# Patient Record
Sex: MALE | Race: WHITE | NOT HISPANIC OR LATINO | Employment: FULL TIME | URBAN - METROPOLITAN AREA
[De-identification: names, ages, dates, MRNs, and addresses within clinical notes are randomized per-mention and may not be internally consistent; named-entity substitution may affect disease eponyms.]

---

## 2018-05-21 ENCOUNTER — OFFICE VISIT (OUTPATIENT)
Dept: FAMILY MEDICINE | Facility: CLINIC | Age: 48
End: 2018-05-21
Payer: COMMERCIAL

## 2018-05-21 VITALS
BODY MASS INDEX: 27.77 KG/M2 | HEIGHT: 72 IN | DIASTOLIC BLOOD PRESSURE: 78 MMHG | WEIGHT: 205 LBS | HEART RATE: 47 BPM | SYSTOLIC BLOOD PRESSURE: 124 MMHG | TEMPERATURE: 98.1 F | OXYGEN SATURATION: 97 %

## 2018-05-21 DIAGNOSIS — S42.001A CLOSED DISPLACED FRACTURE OF RIGHT CLAVICLE, UNSPECIFIED PART OF CLAVICLE, INITIAL ENCOUNTER: Primary | ICD-10-CM

## 2018-05-21 PROCEDURE — 99213 OFFICE O/P EST LOW 20 MIN: CPT | Performed by: INTERNAL MEDICINE

## 2018-05-21 RX ORDER — PENICILLIN V POTASSIUM 500 MG/1
TABLET, FILM COATED ORAL 2 TIMES DAILY
COMMUNITY
End: 2018-10-09
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
## 2018-05-21 NOTE — MR AVS SNAPSHOT
After Visit Summary   5/21/2018    Quincy Harrell    MRN: [id number]           Patient Information     Date Of Birth          1970        Visit Information        Provider Department      5/21/2018 4:00 PM Emma Salgado MD [facility]        Today's Diagnoses     Closed displaced fracture of right clavicle, unspecified part of clavicle, initial encounter    -  1       Follow-ups after your visit        Additional Services     ORTHO  REFERRAL       [facility] is referring you to the Orthopedic  Services at [facility].       The  Representative will assist you in the coordination of your Orthopedic and Musculoskeletal Care as prescribed by your physician.    The  Representative will call you within 1 business day to help schedule your appointment, or you may contact the  Representative at:    All areas ~ [phone]     Type of Referral : Surgical / Specialist       Timeframe requested: 3 - 5 days    Coverage of these services is subject to the terms and limitations of your health insurance plan.  Please call member services at your health plan with any benefit or coverage questions.      If X-rays, CT or MRI's have been performed, please contact the facility where they were done to arrange for , prior to your scheduled appointment.  Please bring this referral request to your appointment and present it to your specialist.                  Who to contact     If you have questions or need follow up information about today's clinic visit or your schedule please contact [facility] directly at [phone].  Normal or non-critical lab and imaging results will be communicated to you by MyChart, letter or phone within 4 business days after the clinic has received the results. If you do not hear from us within 7 days, please contact the clinic through  Livemaphart or phone. If you have a critical or abnormal lab result, we will notify you by phone as soon as possible.  Submit refill requests through Explorys or call your pharmacy and they will forward the refill request to us. Please allow 3 business days for your refill to be completed.          Additional Information About Your Visit        Livemaphart Information     Explorys gives you secure access to your electronic health record. If you see a primary care provider, you can also send messages to your care team and make appointments. If you have questions, please call your primary care clinic.  If you do not have a primary care provider, please call [phone] and they will assist you.        Care EveryWhere ID     This is your Care EveryWhere ID. This could be used by other organizations to access your [facility] medical records  [id number]         Blood Pressure from Last 3 Encounters:   12/05/16 116/79   06/07/16 124/64   05/10/16 116/72    Weight from Last 3 Encounters:   12/05/16 211 lb (95.7 kg)   06/07/16 201 lb (91.2 kg)   05/10/16 203 lb (92.1 kg)              We Performed the Following     ORTHO  REFERRAL        Primary Care Provider Fax #    Physician No Ref-Primary [phone]       No address on file        Equal Access to Services     JAYDA SIMEON : Hadii evan freedmano Sokennediali, waaxda luqadaha, qaybta kaalmada adeegyada, federico crawford. So [facility] [phone].    ATENCIÓN: Si habla español, tiene a daniels disposición servicios gratuitos de asistencia lingüística. Llame al [phone].    We comply with applicable federal civil rights laws and [place] laws. We do not discriminate on the basis of race, color, national origin, age, disability, sex, sexual orientation, or gender identity.            Thank you!     Thank you for choosing [facility]  for your care. Our goal is always to provide you with excellent care. Hearing back from our patients is  one way we can continue to improve our services. Please take a few minutes to complete the written survey that you may receive in the mail after your visit with us. Thank you!             Your Updated Medication List - Protect others around you: Learn how to safely use, store and throw away your medicines at www.disposemymeds.org.          This list is accurate as of 5/21/18  4:29 PM.  Always use your most recent med list.                   Brand Name Dispense Instructions for use Diagnosis    acyclovir 800 MG tablet    ZOVIRAX    35 tablet    Take 1 tablet (800 mg) by mouth 5 times daily    HSV (herpes simplex virus) infection       NO ACTIVE MEDICATIONS       Routine general medical examination at a health care facility, Familial hypercholesteremia, Hyperlipidemia LDL goal <160

## 2018-10-08 ASSESSMENT — ENCOUNTER SYMPTOMS: ARTHRALGIAS: 1

## 2018-10-09 ENCOUNTER — OFFICE VISIT (OUTPATIENT)
Dept: FAMILY MEDICINE | Facility: CLINIC | Age: 48
End: 2018-10-09
Payer: COMMERCIAL

## 2018-10-09 VITALS
WEIGHT: 203 LBS | HEART RATE: 89 BPM | RESPIRATION RATE: 16 BRPM | DIASTOLIC BLOOD PRESSURE: 67 MMHG | SYSTOLIC BLOOD PRESSURE: 109 MMHG | TEMPERATURE: 98.6 F | BODY MASS INDEX: 27.5 KG/M2 | HEIGHT: 72 IN

## 2018-10-09 DIAGNOSIS — B00.9 HSV (HERPES SIMPLEX VIRUS) INFECTION: ICD-10-CM

## 2018-10-09 DIAGNOSIS — Z00.00 WELL ADULT ON ROUTINE HEALTH CHECK: Primary | ICD-10-CM

## 2018-10-09 DIAGNOSIS — Z11.3 SCREEN FOR STD (SEXUALLY TRANSMITTED DISEASE): ICD-10-CM

## 2018-10-09 DIAGNOSIS — D22.9 ATYPICAL NEVUS: ICD-10-CM

## 2018-10-09 LAB
CHOLEST SERPL-MCNC: 242 MG/DL
GLUCOSE SERPL-MCNC: 101 MG/DL (ref 70–99)
HDLC SERPL-MCNC: 52 MG/DL
LDLC SERPL CALC-MCNC: 158 MG/DL
NONHDLC SERPL-MCNC: 190 MG/DL
TRIGL SERPL-MCNC: 162 MG/DL

## 2018-10-09 PROCEDURE — 87389 HIV-1 AG W/HIV-1&-2 AB AG IA: CPT | Performed by: NURSE PRACTITIONER

## 2018-10-09 PROCEDURE — 87591 N.GONORRHOEAE DNA AMP PROB: CPT | Performed by: NURSE PRACTITIONER

## 2018-10-09 PROCEDURE — 82947 ASSAY GLUCOSE BLOOD QUANT: CPT | Performed by: NURSE PRACTITIONER

## 2018-10-09 PROCEDURE — 87491 CHLMYD TRACH DNA AMP PROBE: CPT | Performed by: NURSE PRACTITIONER

## 2018-10-09 PROCEDURE — 86803 HEPATITIS C AB TEST: CPT | Performed by: NURSE PRACTITIONER

## 2018-10-09 PROCEDURE — 99396 PREV VISIT EST AGE 40-64: CPT | Performed by: NURSE PRACTITIONER

## 2018-10-09 PROCEDURE — 80061 LIPID PANEL: CPT | Performed by: NURSE PRACTITIONER

## 2018-10-09 PROCEDURE — 36415 COLL VENOUS BLD VENIPUNCTURE: CPT | Performed by: NURSE PRACTITIONER

## 2018-10-09 RX ORDER — ACYCLOVIR 800 MG/1
800 TABLET ORAL
Qty: 35 TABLET | Refills: 3 | Status: SHIPPED | OUTPATIENT
Start: 2018-10-09 | End: 2020-02-19

## 2018-10-09 ASSESSMENT — ANXIETY QUESTIONNAIRES
1. FEELING NERVOUS, ANXIOUS, OR ON EDGE: NOT AT ALL
5. BEING SO RESTLESS THAT IT IS HARD TO SIT STILL: NOT AT ALL
3. WORRYING TOO MUCH ABOUT DIFFERENT THINGS: NOT AT ALL
6. BECOMING EASILY ANNOYED OR IRRITABLE: NOT AT ALL
2. NOT BEING ABLE TO STOP OR CONTROL WORRYING: NOT AT ALL
GAD7 TOTAL SCORE: 0
IF YOU CHECKED OFF ANY PROBLEMS ON THIS QUESTIONNAIRE, HOW DIFFICULT HAVE THESE PROBLEMS MADE IT FOR YOU TO DO YOUR WORK, TAKE CARE OF THINGS AT HOME, OR GET ALONG WITH OTHER PEOPLE: NOT DIFFICULT AT ALL
7. FEELING AFRAID AS IF SOMETHING AWFUL MIGHT HAPPEN: NOT AT ALL

## 2018-10-09 ASSESSMENT — ENCOUNTER SYMPTOMS: ARTHRALGIAS: 1

## 2018-10-09 ASSESSMENT — PATIENT HEALTH QUESTIONNAIRE - PHQ9: 5. POOR APPETITE OR OVEREATING: NOT AT ALL

## 2018-10-09 NOTE — MR AVS SNAPSHOT
After Visit Summary   10/9/2018    Quincy Harrell    MRN: [id number]           Patient Information     Date Of Birth          1970        Visit Information        Provider Department      10/9/2018 7:40 AM Mago Waters APRN [facility]        Today's Diagnoses     Well adult on routine health check    -  1    HSV (herpes simplex virus) infection        Atypical nevus        Screen for STD (sexually transmitted disease)          Care Instructions      Preventive Health Recommendations  Male Ages 40 to 49    Yearly exam:             See your health care provider every year in order to  o   Review health changes.   o   Discuss preventive care.    o   Review your medicines if your doctor has prescribed any.    You should be tested each year for STDs (sexually transmitted diseases) if you re at risk.     Have a cholesterol test every 5 years.     Have a colonoscopy (test for colon cancer) if someone in your family has had colon cancer or polyps before age 50.     After age 45, have a diabetes test (fasting glucose). If you are at risk for diabetes, you should have this test every 3 years.      Talk with your health care provider about whether or not a prostate cancer screening test (PSA) is right for you.    Shots: Get a flu shot each year. Get a tetanus shot every 10 years.     Nutrition:    Eat at least 5 servings of fruits and vegetables daily.     Eat whole-grain bread, whole-wheat pasta and brown rice instead of white grains and rice.     Get adequate Calcium and Vitamin D.     Lifestyle    Exercise for at least 150 minutes a week (30 minutes a day, 5 days a week). This will help you control your weight and prevent disease.     Limit alcohol to one drink per day.     No smoking.     Wear sunscreen to prevent skin cancer.     See your dentist every six months for an exam and cleaning.              Follow-ups after your visit        Additional Services      DERMATOLOGY REFERRAL       Your provider has referred you to: FMG: [facility] - [place] [phone]   [email or web address]  UM: Dermatology Clinic [facility] [phone]   [email or web address]  FHN: [facility] - [place] / [place] / [place] / [place] [phone]   [email or web address]  N: [facility] [place] [phone]   [email or web address]  [facility]. - [place] [phone]   [email or web address]  [facility] - [place] [phone]   [email or web address]    Please be aware that coverage of these services is subject to the terms and limitations of your health insurance plan.  Call member services at your health plan with any benefit or coverage questions.      Please bring the following to your appointment:  Any x-rays, CTs or MRIs which have been performed.  Contact the facility where they were done to arrange for  prior to your scheduled appointment.  Any new CT, MRI or other procedures ordered by your specialist must be performed at a [place] facility or coordinated by your clinic's referral office.    List of current medications   This referral request   Any documents/labs given to you for this referral                  Who to contact     If you have questions or need follow up information about today's clinic visit or your schedule please contact [facility] directly at [phone].  Normal or non-critical lab and imaging results will be communicated to you by MyChart, letter or phone within 4 business days after the clinic has received the results. If you do not hear from us within 7 days, please contact the clinic through MyChart or phone. If you have a critical or abnormal lab result, we will notify you by phone as  soon as possible.  Submit refill requests through OwnerListens or call your pharmacy and they will forward the refill request to us. Please allow 3 business days for your refill to be completed.          Additional Information About Your Visit        WatsinharPrinciple Power Information     OwnerListens gives you secure access to your electronic health record. If you see a primary care provider, you can also send messages to your care team and make appointments. If you have questions, please call your primary care clinic.  If you do not have a primary care provider, please call [phone] and they will assist you.        Care EveryWhere ID     This is your Care EveryWhere ID. This could be used by other organizations to access your [place] medical records  [id number]        Your Vitals Were     Pulse Temperature Respirations Height BMI (Body Mass Index)       89 98.6  F (37  C) (Oral) 16 6' (1.829 m) 27.53 kg/m2        Blood Pressure from Last 3 Encounters:   10/09/18 109/67   05/21/18 124/78   12/05/16 116/79    Weight from Last 3 Encounters:   10/09/18 203 lb (92.1 kg)   05/21/18 205 lb (93 kg)   12/05/16 211 lb (95.7 kg)              We Performed the Following     CHLAMYDIA TRACHOMATIS PCR     DERMATOLOGY REFERRAL     Glucose     Hepatitis C antibody     HIV Antigen Antibody Combo     Lipid Profile (Chol, Trig, HDL, LDL calc)     NEISSERIA GONORRHOEA PCR          Today's Medication Changes          These changes are accurate as of 10/9/18  8:17 AM.  If you have any questions, ask your nurse or doctor.               These medicines have changed or have updated prescriptions.        Dose/Directions    acyclovir 800 MG tablet   Commonly known as:  ZOVIRAX   This may have changed:  See the new instructions.   Used for:  HSV (herpes simplex virus) infection   Changed by:  Mago Waters APRN CNP        Dose:  800 mg   Take 1 tablet (800 mg) by mouth 5 times daily   Quantity:  35 tablet   Refills:  3            Where to get your  medicines      These medications were sent to [id number] - [place], [place] - [address] AT [facility] [address]  [address], [place] [address]    Hours:  24-hours Phone:  [phone]     acyclovir 800 MG tablet                Primary Care Provider Fax #    Physician No Ref-Primary [phone]       No address on file        Equal Access to Services     JAYDA SIMEON : Hadii aad ku hadasho Soomaali, waaxda luqadaha, qaybta kaalmada adeegyada, waxay idiin hayaan adeeg khdedrash lamatt . So [facility] [phone].    ATENCIÓN: Si habla español, tiene a daniels disposición servicios gratuitos de asistencia lingüística. Galina al [phone].    We comply with applicable federal civil rights laws and [place] laws. We do not discriminate on the basis of race, color, national origin, age, disability, sex, sexual orientation, or gender identity.            Thank you!     Thank you for choosing [facility]  for your care. Our goal is always to provide you with excellent care. Hearing back from our patients is one way we can continue to improve our services. Please take a few minutes to complete the written survey that you may receive in the mail after your visit with us. Thank you!             Your Updated Medication List - Protect others around you: Learn how to safely use, store and throw away your medicines at www.disposemymeds.org.          This list is accurate as of 10/9/18  8:17 AM.  Always use your most recent med list.                   Brand Name Dispense Instructions for use Diagnosis    acyclovir 800 MG tablet    ZOVIRAX    35 tablet    Take 1 tablet (800 mg) by mouth 5 times daily    HSV (herpes simplex virus) infection       NO ACTIVE MEDICATIONS       Routine general medical examination at a health care facility, Familial hypercholesteremia, Hyperlipidemia LDL goal <160

## 2018-10-09 NOTE — PROGRESS NOTES
SUBJECTIVE:   CC: Quincy Harrell is an 48 year old male who presents for preventative health visit.     Physical   Annual:     Getting at least 3 servings of Calcium per day:  NO    Bi-annual eye exam:  Yes    Dental care twice a year:  Yes    Sleep apnea or symptoms of sleep apnea:  None    Diet:  Regular (no restrictions)    Frequency of exercise:  4-5 days/week    Duration of exercise:  45-60 minutes    Taking medications regularly:  Not Applicable    Additional concerns today:  YES (skin rash on right upper leg)    Today's PHQ-2 Score:   PHQ-2 ( 1999 Pfizer) 10/8/2018   Q1: Little interest or pleasure in doing things 0   Q2: Feeling down, depressed or hopeless 0   PHQ-2 Score 0   Q1: Little interest or pleasure in doing things Not at all   Q2: Feeling down, depressed or hopeless Not at all   PHQ-2 Score 0       Abuse: Current or Past(Physical, Sexual or Emotional)- No  Do you feel safe in your environment - Yes    Social History   Substance Use Topics     Smoking status: Never Smoker     Smokeless tobacco: Never Used     Alcohol use 0.0 oz/week      Comment: about 2 week     Alcohol Use 10/8/2018   If you drink alcohol do you typically have greater than 3 drinks per day OR greater than 7 drinks per week? No     Last PSA: No results found for: PSA    Reviewed orders with patient. Reviewed health maintenance and updated orders accordingly - Yes    Reviewed and updated as needed this visit by clinical staff  Tobacco  Allergies  Meds  Problems  Med Hx  Surg Hx  Fam Hx  Soc Hx          Reviewed and updated as needed this visit by Provider  Tobacco  Allergies  Meds  Problems  Med Hx  Surg Hx  Fam Hx  Soc Hx         Review of Systems   Genitourinary: Negative for discharge and impotence.   Musculoskeletal: Positive for arthralgias.     CONSTITUTIONAL: NEGATIVE for fever, chills, change in weight  INTEGUMENTARY/SKIN: NEGATIVE for worrisome rashes, moles or lesions  EYES: NEGATIVE for vision  changes or irritation  ENT: NEGATIVE for ear, mouth and throat problems  RESP: NEGATIVE for significant cough or SOB  CV: NEGATIVE for chest pain, palpitations or peripheral edema  GI: NEGATIVE for nausea, abdominal pain, heartburn, or change in bowel habits   male: negative for dysuria, hematuria, decreased urinary stream, erectile dysfunction, urethral discharge  MUSCULOSKELETAL: NEGATIVE for significant arthralgias or myalgia  NEURO: NEGATIVE for weakness, dizziness or paresthesias  PSYCHIATRIC: NEGATIVE for changes in mood or affect    OBJECTIVE:   /67  Pulse 89  Temp 98.6  F (37  C) (Oral)  Resp 16  Ht 6' (1.829 m)  Wt 203 lb (92.1 kg)  BMI 27.53 kg/m2    Physical Exam  GENERAL: healthy, alert and no distress  EYES: Eyes grossly normal to inspection, PERRL and conjunctivae and sclerae normal  HENT: ear canals and TM's normal, nose and mouth without ulcers or lesions  NECK: no adenopathy, no asymmetry, masses, or scars and thyroid normal to palpation  RESP: lungs clear to auscultation - no rales, rhonchi or wheezes  CV: regular rate and rhythm, normal S1 S2, no S3 or S4, no murmur, click or rub, no peripheral edema and peripheral pulses strong  ABDOMEN: soft, nontender, no hepatosplenomegaly, no masses and bowel sounds normal  MS: no gross musculoskeletal defects noted, no edema  SKIN: 6 mm bicolored changing nevus on right anterior thigh  NEURO: Normal strength and tone, mentation intact and speech normal  PSYCH: mentation appears normal, affect normal/bright    Diagnostic Test Results:  Results for orders placed or performed in visit on 10/09/18 (from the past 24 hour(s))   Lipid Profile (Chol, Trig, HDL, LDL calc)   Result Value Ref Range    Cholesterol 242 (H) <200 mg/dL    Triglycerides 162 (H) <150 mg/dL    HDL Cholesterol 52 >39 mg/dL    LDL Cholesterol Calculated 158 (H) <100 mg/dL    Non HDL Cholesterol 190 (H) <130 mg/dL   Glucose   Result Value Ref Range    Glucose 101 (H) 70 - 99 mg/dL        ASSESSMENT/PLAN:       ICD-10-CM    1. Well adult on routine health check Z00.00 Lipid Profile (Chol, Trig, HDL, LDL calc)     Glucose   2. HSV (herpes simplex virus) infection B00.9 acyclovir (ZOVIRAX) 800 MG tablet   3. Atypical nevus D22.9 DERMATOLOGY REFERRAL   4. Screen for STD (sexually transmitted disease) Z11.3 NEISSERIA GONORRHOEA PCR     CHLAMYDIA TRACHOMATIS PCR     Hepatitis C antibody     HIV Antigen Antibody Combo      well male,  fasting for labs.  Encouraged to continue exercise and healthy diet choices.      Refill acyclovir for PRN use.      Refer to derm for rapidly changing bicolored nevus.      I discussed the transmission and risks associated with STD's as well as appropriate prevention, screening and treatment.      COUNSELING:   Reviewed preventive health counseling, as reflected in patient instructions    BP Readings from Last 1 Encounters:   10/09/18 109/67     Estimated body mass index is 27.53 kg/(m^2) as calculated from the following:    Height as of this encounter: 6' (1.829 m).    Weight as of this encounter: 203 lb (92.1 kg).           reports that he has never smoked. He has never used smokeless tobacco.      Counseling Resources:  ATP IV Guidelines  Pooled Cohorts Equation Calculator  FRAX Risk Assessment  ICSI Preventive Guidelines  Dietary Guidelines for Americans, 2010  USDA's MyPlate  ASA Prophylaxis  Lung CA Screening    ROSETTA Adams CNP  [facility] [place]  Answers for HPI/ROS submitted by the patient on 10/8/2018   PHQ-2 Score: 0

## 2018-10-10 LAB
C TRACH DNA SPEC QL NAA+PROBE: NEGATIVE
HCV AB SERPL QL IA: NONREACTIVE
HIV 1+2 AB+HIV1 P24 AG SERPL QL IA: NONREACTIVE
N GONORRHOEA DNA SPEC QL NAA+PROBE: NEGATIVE
SPECIMEN SOURCE: NORMAL
SPECIMEN SOURCE: NORMAL

## 2018-10-10 ASSESSMENT — PATIENT HEALTH QUESTIONNAIRE - PHQ9: SUM OF ALL RESPONSES TO PHQ QUESTIONS 1-9: 1

## 2018-10-10 ASSESSMENT — ANXIETY QUESTIONNAIRES: GAD7 TOTAL SCORE: 0

## 2018-10-15 ENCOUNTER — OFFICE VISIT (OUTPATIENT)
Dept: FAMILY MEDICINE | Facility: CLINIC | Age: 48
End: 2018-10-15
Payer: COMMERCIAL

## 2018-10-15 VITALS — HEART RATE: 49 BPM | SYSTOLIC BLOOD PRESSURE: 121 MMHG | DIASTOLIC BLOOD PRESSURE: 70 MMHG

## 2018-10-15 DIAGNOSIS — D48.5 NEOPLASM OF UNCERTAIN BEHAVIOR OF SKIN: Primary | ICD-10-CM

## 2018-10-15 PROCEDURE — 11300 SHAVE SKIN LESION 0.5 CM/<: CPT | Performed by: FAMILY MEDICINE

## 2018-10-15 PROCEDURE — 88305 TISSUE EXAM BY PATHOLOGIST: CPT | Mod: TC | Performed by: FAMILY MEDICINE

## 2018-10-15 NOTE — LETTER
10/15/2018         RE: Quincy Harrell  [address] [place] [address]        Dear Colleague,    Thank you for referring your patient, Quincy Harrell, to [facility]. Please see a copy of my visit note below.    [facility] - PRIMARY CARE SKIN    CC : Lesion(s)  SUBJECTIVE:                                                    Quincy Harrell is a 48 year old male who presents to clinic today because of a mole on the right leg.    Bothersome lesions noticed by the patient or other skin concerns :  Issue One : A mole on the right leg has been changing over the last 6 weeks and has a dry texture.  Onset : beginning of Sept 2018.  Enlarging : NO.  Bleeding : NO  Itchy or irritating : NO.  Pain or tenderness : NO.  Changing color : YES.    Personal history of skin cancer : NO.  Family history of skin cancer : YES - skin cancer unknown type in grandfather.    Personal Medical History  Eczema Psoriasis Autoimmune   NO NO NO     Family Medical History  Eczema Psoriasis Autoimmune   NO ?YES in father NO     Sun Exposure History  Previous history of significant sun exposure:  Blistering sunburns : NO  Tanning beds : NO.  Sunscreen Use : YES, SPF : 50.  Sun-protective clothing use : Yes  Wide-brimmed hats : Yes  Sunglasses : unknown  Seeks shade : Yes    Occupation : office work (indoor).    Refer to electronic medical record (EMR) for past medical history and medications.    INTEGUMENTARY/SKIN: POSITIVE for mole changes  ROS : 14 point review of systems was negative except the symptoms listed above in the HPI.    This document serves as a record of the services and decisions personally performed and made by Octavia Abbott MD. It was created on her behalf by Masoud Butts, a trained medical scribe.  The creation of this document is based on the scribe's personal observations and the provider's statements to the medical scribe.  Masoud Butts, October 15, 2018 7:59  "AM      OBJECTIVE:                                                    GENERAL: healthy, alert and no distress  SKIN: Lambert Skin Type - II.  Legs were examined. The dermatoscope was used to help evaluate pigmented lesions.  Skin Pertinent Findings:  Right anterolateral thigh : 4 mm in size scaly brown coarse-textured lesion(s) with radial pigmentation pattern. ? Seborrheic keratosis ? Other    Diagnostic Test Results:  none           ASSESSMENT:                                                      Encounter Diagnosis   Name Primary?     Neoplasm of uncertain behavior of skin Yes         PLAN:                                                    Patient Instructions   FUTURE APPOINTMENTS  Follow up per pathology report.    WOUND CARE INSTRUCTIONS  1. Wash hands before every dressing change.  2. After 24 hours, change dressing daily.  3. Wash the wound area with a mild soap, then rinse.  4. Gently pat dry with a sterile gauze or Q-tip.  5. Using a Q-tip, apply Vaseline or Aquaphor only over entire wound. Do NOT use Neosporin - as many people react to neomycin.  6. Finally, cover with a bandage or sterile non-stick gauze with micropore paper tape.  7. Repeat once daily until wound has healed.      Soap, water and shampoo will not hurt this area.    Do not go swimming or take baths, but showering is encouraged.    Limit use of the area where the procedure was done for a few days to allow for optimal healing.    If you experience bleeding:  Wash hands and hold firm pressure on the area for 10 minutes without checking to see if the bleeding has stopped. \"Checking\" pulls off the protective wound clot and restarts the bleeding all over again. Re-apply pressure for 10 minutes if necessary to stop bleeding.  Use additional sterile gauze and tape to maintain pressure once bleeding has stopped.  If bleeding continues, then call back to clinic at [phone].    Signs of Infection:  Infection can occur in any area where " skin has been disrupted.  If you notice persistent redness, swelling, colored drainage, increasing pain, fever or other signs of infection, please call us at: [phone] and ask to have me or my colleague paged. We will call you back to discuss.    Pathology Results:  You will be notified, generally via letter or MyChart, in approximately 10 days. If there is anything we need to discuss or further treatment needed, I will call you to discuss it.    PATIENT INFORMATION : WOUNDS  During the healing process you will notice a number of changes. All wounds develop a small halo of redness surrounding the wound.  This means healing is occurring. Severe itching with extensive redness usually indicates sensitivity to the ointment or bandage tape used to dress the wound.  You should call our office if this develops.      Swelling  and/or discoloration around your surgical site is common, particularly when performed around the eye.    All wounds normally drain.  The larger the wound the more drainage there will be.  After 7-10 days, you will notice the wound beginning to shrink and new skin will begin to grow.  The wound is healed when you can see skin has formed over the entire area.  A healed wound has a healthy, shiny look to the surface and is red to dark pink in color to normalize.  Wounds may take approximately 4-6 weeks to heal.  Larger wounds may take 6-8 weeks. After the wound is healed you may discontinue dressing changes.    You may experience a sensation of tightness as your wound heals. This is normal and will gradually subside.    Your healed wound may be sensitive to temperature changes. This sensitivity improves with time, but if you re having a lot of discomfort, try to avoid temperature extremes.    Patients frequently experience itching after their wound appears to have healed because of the continue healing under the skin.  Plain Vaseline will help relieve the itching.    Educational brochures given to  patient : seborrheic keratosis, skin cancer.       PROCEDURES:                                                    Name : Shave Excision  Indication : Excision of tissue for pathology evaluation.  Location(s) : Right anterolateral thigh : 4 mm in size scaly brown coarse-textured lesion(s) with radial pigmentation pattern. ? Seborrheic keratosis ? Other.  Completed by : Octavia Abbott MD  Photo Taken : no.  Anesthesia : Patient was anesthetized by infiltrating the area surrounding the lesion with 1% lidocaine.   epinephrine [id number] : Yes.  Note : Discussed the risk of pain, infection, scarring, hypo- or hyperpigmentation and recurrence or need for re-treatment. The benefits of treatment and alternative treatments were also discussed.    During this procedure, the universal protocol was utilized. The patient's identity was confirmed by no less than two patient identifiers, correct procedure was verified, correct site was verified and marked as applicable and a final pause was completed.    Sterile technique was used throughout the procedure. The skin was cleaned and prepped with surgical cleanser. Once adequate anesthesia was obtained, the lesion was removed with a deep scallop shave procedure. The specimen was sent to pathology.    Direct pressure and aluminum chloride and monopolar cautery was applied for hemostasis. No bleeding was present upon the completion of the procedure. The wound was coated with antibacterial ointment. A dry sterile dressing was applied. Patient tolerated the procedure well and left in satisfactory condition.    Primary provider and referring provider will be informed regarding the tissue report when it returns.      The information in this document, created by the medical scribe for me, accurately reflects the services I personally performed and the decisions made by me. I have reviewed and approved this document for accuracy prior to leaving the patient care area.  Octavia Abbott MD October  15, 2018 7:59 AM  [facility] [place]    Again, thank you for allowing me to participate in the care of your patient.        Sincerely,        Violette Abbott MD

## 2018-10-15 NOTE — PROGRESS NOTES
[facility] - PRIMARY CARE SKIN    CC : Lesion(s)  SUBJECTIVE:                                                    Quincy Harrell is a 48 year old male who presents to clinic today because of a mole on the right leg.    Bothersome lesions noticed by the patient or other skin concerns :  Issue One : A mole on the right leg has been changing over the last 6 weeks and has a dry texture.  Onset : beginning of Sept 2018.  Enlarging : NO.  Bleeding : NO  Itchy or irritating : NO.  Pain or tenderness : NO.  Changing color : YES.    Personal history of skin cancer : NO.  Family history of skin cancer : YES - skin cancer unknown type in grandfather.    Personal Medical History  Eczema Psoriasis Autoimmune   NO NO NO     Family Medical History  Eczema Psoriasis Autoimmune   NO ?YES in father NO     Sun Exposure History  Previous history of significant sun exposure:  Blistering sunburns : NO  Tanning beds : NO.  Sunscreen Use : YES, SPF : 50.  Sun-protective clothing use : Yes  Wide-brimmed hats : Yes  Sunglasses : unknown  Seeks shade : Yes    Occupation : office work (indoor).    Refer to electronic medical record (EMR) for past medical history and medications.    INTEGUMENTARY/SKIN: POSITIVE for mole changes  ROS : 14 point review of systems was negative except the symptoms listed above in the HPI.    This document serves as a record of the services and decisions personally performed and made by Octavia Abbott MD. It was created on her behalf by Masoud Butts, a trained medical scribe.  The creation of this document is based on the scribe's personal observations and the provider's statements to the medical scribe.  Masoud Butts, October 15, 2018 7:59 AM      OBJECTIVE:                                                    GENERAL: healthy, alert and no distress  SKIN: Lambert Skin Type - II.  Legs were examined. The dermatoscope was used to help evaluate pigmented lesions.  Skin Pertinent Findings:  Right  "anterolateral thigh : 4 mm in size scaly brown coarse-textured lesion(s) with radial pigmentation pattern. ? Seborrheic keratosis ? Other    Diagnostic Test Results:  none           ASSESSMENT:                                                      Encounter Diagnosis   Name Primary?     Neoplasm of uncertain behavior of skin Yes         PLAN:                                                    Patient Instructions   FUTURE APPOINTMENTS  Follow up per pathology report.    WOUND CARE INSTRUCTIONS  1. Wash hands before every dressing change.  2. After 24 hours, change dressing daily.  3. Wash the wound area with a mild soap, then rinse.  4. Gently pat dry with a sterile gauze or Q-tip.  5. Using a Q-tip, apply Vaseline or Aquaphor only over entire wound. Do NOT use Neosporin - as many people react to neomycin.  6. Finally, cover with a bandage or sterile non-stick gauze with micropore paper tape.  7. Repeat once daily until wound has healed.      Soap, water and shampoo will not hurt this area.    Do not go swimming or take baths, but showering is encouraged.    Limit use of the area where the procedure was done for a few days to allow for optimal healing.    If you experience bleeding:  Wash hands and hold firm pressure on the area for 10 minutes without checking to see if the bleeding has stopped. \"Checking\" pulls off the protective wound clot and restarts the bleeding all over again. Re-apply pressure for 10 minutes if necessary to stop bleeding.  Use additional sterile gauze and tape to maintain pressure once bleeding has stopped.  If bleeding continues, then call back to clinic at [phone].    Signs of Infection:  Infection can occur in any area where skin has been disrupted.  If you notice persistent redness, swelling, colored drainage, increasing pain, fever or other signs of infection, please call us at: [phone] and ask to have me or my colleague paged. We will call you back to discuss.    Pathology " Results:  You will be notified, generally via letter or MyChart, in approximately 10 days. If there is anything we need to discuss or further treatment needed, I will call you to discuss it.    PATIENT INFORMATION : WOUNDS  During the healing process you will notice a number of changes. All wounds develop a small halo of redness surrounding the wound.  This means healing is occurring. Severe itching with extensive redness usually indicates sensitivity to the ointment or bandage tape used to dress the wound.  You should call our office if this develops.      Swelling  and/or discoloration around your surgical site is common, particularly when performed around the eye.    All wounds normally drain.  The larger the wound the more drainage there will be.  After 7-10 days, you will notice the wound beginning to shrink and new skin will begin to grow.  The wound is healed when you can see skin has formed over the entire area.  A healed wound has a healthy, shiny look to the surface and is red to dark pink in color to normalize.  Wounds may take approximately 4-6 weeks to heal.  Larger wounds may take 6-8 weeks. After the wound is healed you may discontinue dressing changes.    You may experience a sensation of tightness as your wound heals. This is normal and will gradually subside.    Your healed wound may be sensitive to temperature changes. This sensitivity improves with time, but if you re having a lot of discomfort, try to avoid temperature extremes.    Patients frequently experience itching after their wound appears to have healed because of the continue healing under the skin.  Plain Vaseline will help relieve the itching.    Educational brochures given to patient : seborrheic keratosis, skin cancer.       PROCEDURES:                                                    Name : Shave Excision  Indication : Excision of tissue for pathology evaluation.  Location(s) : Right anterolateral thigh : 4 mm in size scaly brown  coarse-textured lesion(s) with radial pigmentation pattern. ? Seborrheic keratosis ? Other.  Completed by : Octavia Abbott MD  Photo Taken : no.  Anesthesia : Patient was anesthetized by infiltrating the area surrounding the lesion with 1% lidocaine.   epinephrine [id number] : Yes.  Note : Discussed the risk of pain, infection, scarring, hypo- or hyperpigmentation and recurrence or need for re-treatment. The benefits of treatment and alternative treatments were also discussed.    During this procedure, the universal protocol was utilized. The patient's identity was confirmed by no less than two patient identifiers, correct procedure was verified, correct site was verified and marked as applicable and a final pause was completed.    Sterile technique was used throughout the procedure. The skin was cleaned and prepped with surgical cleanser. Once adequate anesthesia was obtained, the lesion was removed with a deep scallop shave procedure. The specimen was sent to pathology.    Direct pressure and aluminum chloride and monopolar cautery was applied for hemostasis. No bleeding was present upon the completion of the procedure. The wound was coated with antibacterial ointment. A dry sterile dressing was applied. Patient tolerated the procedure well and left in satisfactory condition.    Primary provider and referring provider will be informed regarding the tissue report when it returns.      The information in this document, created by the medical scribe for me, accurately reflects the services I personally performed and the decisions made by me. I have reviewed and approved this document for accuracy prior to leaving the patient care area.  Octavia Abbott MD October 15, 2018 7:59 AM  [facility] [place]

## 2018-10-15 NOTE — PATIENT INSTRUCTIONS
"FUTURE APPOINTMENTS  Follow up per pathology report.    WOUND CARE INSTRUCTIONS  1. Wash hands before every dressing change.  2. After 24 hours, change dressing daily.  3. Wash the wound area with a mild soap, then rinse.  4. Gently pat dry with a sterile gauze or Q-tip.  5. Using a Q-tip, apply Vaseline or Aquaphor only over entire wound. Do NOT use Neosporin - as many people react to neomycin.  6. Finally, cover with a bandage or sterile non-stick gauze with micropore paper tape.  7. Repeat once daily until wound has healed.      Soap, water and shampoo will not hurt this area.    Do not go swimming or take baths, but showering is encouraged.    Limit use of the area where the procedure was done for a few days to allow for optimal healing.    If you experience bleeding:  Wash hands and hold firm pressure on the area for 10 minutes without checking to see if the bleeding has stopped. \"Checking\" pulls off the protective wound clot and restarts the bleeding all over again. Re-apply pressure for 10 minutes if necessary to stop bleeding.  Use additional sterile gauze and tape to maintain pressure once bleeding has stopped.  If bleeding continues, then call back to clinic at [phone].    Signs of Infection:  Infection can occur in any area where skin has been disrupted.  If you notice persistent redness, swelling, colored drainage, increasing pain, fever or other signs of infection, please call us at: [phone] and ask to have me or my colleague paged. We will call you back to discuss.    Pathology Results:  You will be notified, generally via letter or MyChart, in approximately 10 days. If there is anything we need to discuss or further treatment needed, I will call you to discuss it.    PATIENT INFORMATION : WOUNDS  During the healing process you will notice a number of changes. All wounds develop a small halo of redness surrounding the wound.  This means healing is occurring. Severe itching with extensive " redness usually indicates sensitivity to the ointment or bandage tape used to dress the wound.  You should call our office if this develops.      Swelling  and/or discoloration around your surgical site is common, particularly when performed around the eye.    All wounds normally drain.  The larger the wound the more drainage there will be.  After 7-10 days, you will notice the wound beginning to shrink and new skin will begin to grow.  The wound is healed when you can see skin has formed over the entire area.  A healed wound has a healthy, shiny look to the surface and is red to dark pink in color to normalize.  Wounds may take approximately 4-6 weeks to heal.  Larger wounds may take 6-8 weeks. After the wound is healed you may discontinue dressing changes.    You may experience a sensation of tightness as your wound heals. This is normal and will gradually subside.    Your healed wound may be sensitive to temperature changes. This sensitivity improves with time, but if you re having a lot of discomfort, try to avoid temperature extremes.    Patients frequently experience itching after their wound appears to have healed because of the continue healing under the skin.  Plain Vaseline will help relieve the itching.

## 2018-10-15 NOTE — MR AVS SNAPSHOT
"              After Visit Summary   10/15/2018    Quincy Harrell    MRN: [id number]           Patient Information     Date Of Birth          1970        Visit Information        Provider Department      10/15/2018 8:00 AM Violette Abbott MD [facility] [place]        Today's Diagnoses     Neoplasm of uncertain behavior of skin    -  1      Care Instructions    FUTURE APPOINTMENTS  Follow up per pathology report.    WOUND CARE INSTRUCTIONS  1. Wash hands before every dressing change.  2. After 24 hours, change dressing daily.  3. Wash the wound area with a mild soap, then rinse.  4. Gently pat dry with a sterile gauze or Q-tip.  5. Using a Q-tip, apply Vaseline or Aquaphor only over entire wound. Do NOT use Neosporin - as many people react to neomycin.  6. Finally, cover with a bandage or sterile non-stick gauze with micropore paper tape.  7. Repeat once daily until wound has healed.      Soap, water and shampoo will not hurt this area.    Do not go swimming or take baths, but showering is encouraged.    Limit use of the area where the procedure was done for a few days to allow for optimal healing.    If you experience bleeding:  Wash hands and hold firm pressure on the area for 10 minutes without checking to see if the bleeding has stopped. \"Checking\" pulls off the protective wound clot and restarts the bleeding all over again. Re-apply pressure for 10 minutes if necessary to stop bleeding.  Use additional sterile gauze and tape to maintain pressure once bleeding has stopped.  If bleeding continues, then call back to clinic at [phone].    Signs of Infection:  Infection can occur in any area where skin has been disrupted.  If you notice persistent redness, swelling, colored drainage, increasing pain, fever or other signs of infection, please call us at: [phone] and ask to have me or my colleague paged. We will call you back to discuss.    Pathology Results:  You will " be notified, generally via letter or MyChart, in approximately 10 days. If there is anything we need to discuss or further treatment needed, I will call you to discuss it.    PATIENT INFORMATION : WOUNDS  During the healing process you will notice a number of changes. All wounds develop a small halo of redness surrounding the wound.  This means healing is occurring. Severe itching with extensive redness usually indicates sensitivity to the ointment or bandage tape used to dress the wound.  You should call our office if this develops.      Swelling  and/or discoloration around your surgical site is common, particularly when performed around the eye.    All wounds normally drain.  The larger the wound the more drainage there will be.  After 7-10 days, you will notice the wound beginning to shrink and new skin will begin to grow.  The wound is healed when you can see skin has formed over the entire area.  A healed wound has a healthy, shiny look to the surface and is red to dark pink in color to normalize.  Wounds may take approximately 4-6 weeks to heal.  Larger wounds may take 6-8 weeks. After the wound is healed you may discontinue dressing changes.    You may experience a sensation of tightness as your wound heals. This is normal and will gradually subside.    Your healed wound may be sensitive to temperature changes. This sensitivity improves with time, but if you re having a lot of discomfort, try to avoid temperature extremes.    Patients frequently experience itching after their wound appears to have healed because of the continue healing under the skin.  Plain Vaseline will help relieve the itching.          Follow-ups after your visit        Who to contact     If you have questions or need follow up information about today's clinic visit or your schedule please contact [facility] [place] directly at [phone].  Normal or non-critical lab and imaging results will be communicated to you by  MyChart, letter or phone within 4 business days after the clinic has received the results. If you do not hear from us within 7 days, please contact the clinic through [employer] or phone. If you have a critical or abnormal lab result, we will notify you by phone as soon as possible.  Submit refill requests through [employer] or call your pharmacy and they will forward the refill request to us. Please allow 3 business days for your refill to be completed.          Additional Information About Your Visit        [employer] Information     [employer] gives you secure access to your electronic health record. If you see a primary care provider, you can also send messages to your care team and make appointments. If you have questions, please call your primary care clinic.  If you do not have a primary care provider, please call [phone] and they will assist you.        Care EveryWhere ID     This is your Care EveryWhere ID. This could be used by other organizations to access your [place] medical records  [id number]        Your Vitals Were     Pulse                   49            Blood Pressure from Last 3 Encounters:   10/15/18 121/70   10/09/18 109/67   05/21/18 124/78    Weight from Last 3 Encounters:   10/09/18 203 lb (92.1 kg)   05/21/18 205 lb (93 kg)   12/05/16 211 lb (95.7 kg)              Today, you had the following     No orders found for display       Primary Care Provider Office Phone # Fax #    Mago Waters APRBALTAZAR [employer] [phone] [address] [facility] [address]        Equal Access to Services     JAYDA SIMEON : Hadii aad ku hadasho Soomaali, waaxda luqadaha, qaybta kaalmada adeegyada, federico ochoa . So [facility] [phone].    ATENCIÓN: Si habla español, tiene a daniels disposición servicios gratuitos de asistencia lingüística. Llame al [phone].    We comply with applicable federal civil rights laws and [place] laws. We do not discriminate on the basis  of race, color, national origin, age, disability, sex, sexual orientation, or gender identity.            Thank you!     Thank you for choosing [facility] [place]  for your care. Our goal is always to provide you with excellent care. Hearing back from our patients is one way we can continue to improve our services. Please take a few minutes to complete the written survey that you may receive in the mail after your visit with us. Thank you!             Your Updated Medication List - Protect others around you: Learn how to safely use, store and throw away your medicines at www.disposemymeds.org.          This list is accurate as of 10/15/18  8:09 AM.  Always use your most recent med list.                   Brand Name Dispense Instructions for use Diagnosis    acyclovir 800 MG tablet    ZOVIRAX    35 tablet    Take 1 tablet (800 mg) by mouth 5 times daily    HSV (herpes simplex virus) infection       NO ACTIVE MEDICATIONS       Routine general medical examination at a health care facility, Familial hypercholesteremia, Hyperlipidemia LDL goal <160

## 2018-10-17 LAB — COPATH REPORT: NORMAL

## 2019-11-02 ENCOUNTER — HEALTH MAINTENANCE LETTER (OUTPATIENT)
Age: 49
End: 2019-11-02

## 2020-02-10 ENCOUNTER — HEALTH MAINTENANCE LETTER (OUTPATIENT)
Age: 50
End: 2020-02-10

## 2020-02-19 ENCOUNTER — OFFICE VISIT (OUTPATIENT)
Dept: FAMILY MEDICINE | Facility: CLINIC | Age: 50
End: 2020-02-19
Payer: COMMERCIAL

## 2020-02-19 VITALS
BODY MASS INDEX: 28.85 KG/M2 | RESPIRATION RATE: 20 BRPM | SYSTOLIC BLOOD PRESSURE: 120 MMHG | HEIGHT: 72 IN | TEMPERATURE: 97.1 F | HEART RATE: 50 BPM | WEIGHT: 213 LBS | OXYGEN SATURATION: 98 % | DIASTOLIC BLOOD PRESSURE: 80 MMHG

## 2020-02-19 DIAGNOSIS — Z11.3 SCREEN FOR STD (SEXUALLY TRANSMITTED DISEASE): ICD-10-CM

## 2020-02-19 DIAGNOSIS — R73.01 IMPAIRED FASTING GLUCOSE: ICD-10-CM

## 2020-02-19 DIAGNOSIS — E78.49 OTHER HYPERLIPIDEMIA: ICD-10-CM

## 2020-02-19 DIAGNOSIS — Z12.11 SPECIAL SCREENING FOR MALIGNANT NEOPLASMS, COLON: ICD-10-CM

## 2020-02-19 DIAGNOSIS — Z00.00 ROUTINE GENERAL MEDICAL EXAMINATION AT A HEALTH CARE FACILITY: Primary | ICD-10-CM

## 2020-02-19 DIAGNOSIS — B00.9 HSV-2 INFECTION: ICD-10-CM

## 2020-02-19 LAB
CHOLEST SERPL-MCNC: 278 MG/DL
GLUCOSE SERPL-MCNC: 95 MG/DL (ref 70–99)
HDLC SERPL-MCNC: 45 MG/DL
LDLC SERPL CALC-MCNC: 200 MG/DL
NONHDLC SERPL-MCNC: 233 MG/DL
TRIGL SERPL-MCNC: 164 MG/DL

## 2020-02-19 PROCEDURE — 86803 HEPATITIS C AB TEST: CPT | Performed by: FAMILY MEDICINE

## 2020-02-19 PROCEDURE — 87389 HIV-1 AG W/HIV-1&-2 AB AG IA: CPT | Performed by: FAMILY MEDICINE

## 2020-02-19 PROCEDURE — 36415 COLL VENOUS BLD VENIPUNCTURE: CPT | Performed by: FAMILY MEDICINE

## 2020-02-19 PROCEDURE — 99396 PREV VISIT EST AGE 40-64: CPT | Performed by: FAMILY MEDICINE

## 2020-02-19 PROCEDURE — 87591 N.GONORRHOEAE DNA AMP PROB: CPT | Performed by: FAMILY MEDICINE

## 2020-02-19 PROCEDURE — 80061 LIPID PANEL: CPT | Performed by: FAMILY MEDICINE

## 2020-02-19 PROCEDURE — 99213 OFFICE O/P EST LOW 20 MIN: CPT | Mod: 25 | Performed by: FAMILY MEDICINE

## 2020-02-19 PROCEDURE — 86780 TREPONEMA PALLIDUM: CPT | Performed by: FAMILY MEDICINE

## 2020-02-19 PROCEDURE — 87491 CHLMYD TRACH DNA AMP PROBE: CPT | Performed by: FAMILY MEDICINE

## 2020-02-19 PROCEDURE — 82947 ASSAY GLUCOSE BLOOD QUANT: CPT | Performed by: FAMILY MEDICINE

## 2020-02-19 RX ORDER — ACYCLOVIR 800 MG/1
800 TABLET ORAL
Qty: 35 TABLET | Refills: 3 | Status: SHIPPED | OUTPATIENT
Start: 2020-02-19 | End: 2023-02-21

## 2020-02-19 ASSESSMENT — MIFFLIN-ST. JEOR: SCORE: 1874.16

## 2020-02-19 NOTE — PROGRESS NOTES
SUBJECTIVE:   CC: Quincy Harrell is an 49 year old male who presents for preventative health visit.     Healthy Habits:     Getting at least 3 servings of Calcium per day:  Yes    Bi-annual eye exam:  Yes    Dental care twice a year:  Yes    Sleep apnea or symptoms of sleep apnea:  None    Diet:  Regular (no restrictions)    Frequency of exercise:  4-5 days/week    Duration of exercise:  45-60 minutes    Taking medications regularly:  Yes    Barriers to taking medications:  None    Medication side effects:  Not applicable    PHQ-2 Total Score: 0    Additional concerns today:  No        Got flu shot in Nov 2019 through his job.  Wants to get STD testing done.  No specific concerns for STD.  No symptoms of an STD(s).  Has HLD, not on any medication.  Has HSV-2; on Acyclovir on PRN basis for flare ups.  Not burdened by too many flare-ups, so would like to keep Acyclovir on PRN basis vs taking it on daily basis. Needs med refilled.      Today's PHQ-2 Score:   PHQ-2 ( 1999 Pf[employer]) 2/13/2020   Q1: Little interest or pleasure in doing things 0   Q2: Feeling down, depressed or hopeless 0   PHQ-2 Score 0   Q1: Little interest or pleasure in doing things Not at all   Q2: Feeling down, depressed or hopeless Not at all   PHQ-2 Score 0       Abuse: Current or Past(Physical, Sexual or Emotional)- No  Do you feel safe in your environment? Yes        Social History     Tobacco Use     Smoking status: Never Smoker     Smokeless tobacco: Never Used   Substance Use Topics     Alcohol use: Yes     Alcohol/week: 0.0 standard drinks     Comment: about 2 week       AUDIT - Alcohol Use Disorders Identification Test - Reproduced from the World Health Organization Audit 2001 (Second Edition) 2/13/2020   1.  How often do you have a drink containing alcohol? 4 or more times a week   2.  How many drinks containing alcohol do you have on a typical day when you are drinking? 1 or 2   3.  How often do you have five or more drinks on  one occasion? Never   4.  How often during the last year have you found that you were not able to stop drinking once you had started? Never   5.  How often during the last year have you failed to do what was normally expected of you because of drinking? Never   6.  How often during the last year have you needed a first drink in the morning to get yourself going after a heavy drinking session? Never   7.  How often during the last year have you had a feeling of guilt or remorse after drinking? Never   8.  How often during the last year have you been unable to remember what happened the night before because of your drinking? Never   9.  Have you or someone else been injured because of your drinking? No   10. Has a relative, friend, doctor or other health care worker been concerned about your drinking or suggested you cut down? No   TOTAL SCORE 4       Last PSA: No results found for: PSA    Reviewed orders with patient. Reviewed health maintenance and updated orders accordingly - Yes  Lab work is in process  Labs reviewed in EPIC    Reviewed and updated as needed this visit by clinical staff  Tobacco  Allergies  Meds  Problems  Med Hx  Surg Hx  Fam Hx  Soc Hx          Reviewed and updated as needed this visit by Provider  Tobacco  Allergies  Meds  Problems  Med Hx  Surg Hx  Fam Hx          Past Medical History:   Diagnosis Date     Palpitations 2000    Had negative holter monitor and neg stress 2001      Past Surgical History:   Procedure Laterality Date     GENITOURINARY SURGERY  3/2/18    VAsectomy     HC REMOVAL OF TONSILS,13+ Y/O       ORTHOPEDIC SURGERY  5/13/18    Plate/screws used to repair broken collarbone       Review of Systems  CONSTITUTIONAL: NEGATIVE for fever, chills, change in weight  INTEGUMENTARY/SKIN: NEGATIVE for worrisome rashes, moles or lesions  EYES: NEGATIVE for vision changes or irritation  ENT: NEGATIVE for ear, mouth and throat problems  RESP: NEGATIVE for significant cough or  SOB  CV: NEGATIVE for chest pain, palpitations or peripheral edema  GI: NEGATIVE for nausea, abdominal pain, heartburn, or change in bowel habits   male: negative for dysuria, hematuria, decreased urinary stream, erectile dysfunction, urethral discharge  MUSCULOSKELETAL: NEGATIVE for significant arthralgias or myalgia  NEURO: NEGATIVE for weakness, dizziness or paresthesias  HEME/ALLERGY/IMMUNE: NEGATIVE for bleeding problems  PSYCHIATRIC: NEGATIVE for changes in mood or affect    OBJECTIVE:   /80   Pulse 50   Temp 97.1  F (36.2  C) (Tympanic)   Resp 20   Ht 1.829 m (6')   Wt 96.6 kg (213 lb)   SpO2 98%   BMI 28.89 kg/m      Physical Exam  GENERAL: healthy, alert and no distress  EYES: Eyes grossly normal to inspection, PERRL and conjunctivae and sclerae normal  HENT: ear canals and TM's normal, nose and mouth without ulcers or lesions  NECK: no adenopathy, no asymmetry, masses, or scars and thyroid normal to palpation  RESP: lungs clear to auscultation - no rales, rhonchi or wheezes  CV: regular rate and rhythm, normal S1 S2, no S3 or S4, no murmur, click or rub, no peripheral edema and peripheral pulses strong  ABDOMEN: soft, nontender, no hepatosplenomegaly, no masses and bowel sounds normal   (male): declined exam  MS: no gross musculoskeletal defects noted, no edema  SKIN: no suspicious lesions or rashes  NEURO: Normal strength and tone, mentation intact and speech normal  PSYCH: mentation appears normal, affect normal/bright    Diagnostic Test Results:  Labs reviewed in Epic    ASSESSMENT/PLAN:   Quincy was seen today for physical.    Diagnoses and all orders for this visit:    Routine general medical examination at a health care facility    HSV-2 infection  -     acyclovir 800 MG PO tablet; Take 1 tablet (800 mg) by mouth 5 times daily    Other hyperlipidemia  -     Lipid Profile    Impaired fasting glucose  -     Glucose    Special screening for malignant neoplasms, colon  -      GASTROENTEROLOGY ADULT REF PROCEDURE ONLY [facility] [phone]    Screen for STD (sexually transmitted disease)  -     NEISSERIA GONORRHOEA PCR  -     CHLAMYDIA TRACHOMATIS PCR  -     HIV Antigen Antibody Combo  -     **Hepatitis C Screen Reflex to RNA FUTURE anytime  -     Treponema Abs w Reflex to RPR and Titer      --STD screening as requested  --Refilled Acyclovir for PRN genital herpes outbreaks.  --preventive cares:  Declined PSA  Agreeable to get Colonoscopy for colon cancer screening  Fasting lipids and glucose    The 10-year ASCVD risk score (Juanita LOVETT Jr., et al., 2013) is: 3.2%    Values used to calculate the score:      Age: 49 years      Sex: Male      Is Non- : No      Diabetic: No      Tobacco smoker: No      Systolic Blood Pressure: 120 mmHg      Is BP treated: No      HDL Cholesterol: 52 mg/dL      Total Cholesterol: 242 mg/dL      COUNSELING:   Reviewed preventive health counseling, as reflected in patient instructions  Special attention given to:        Regular exercise       Healthy diet/nutrition       Vision screening       Hearing screening       Immunizations    Up To Date       HIV screeninx in teen years, 1x in adult years, and at intervals if high risk       Colon cancer screening       Prostate cancer screening       The 10-year ASCVD risk score (Juanita LOVETT Jr., et al., 2013) is: 3.2%    Values used to calculate the score:      Age: 49 years      Sex: Male      Is Non- : No      Diabetic: No      Tobacco smoker: No      Systolic Blood Pressure: 120 mmHg      Is BP treated: No      HDL Cholesterol: 52 mg/dL      Total Cholesterol: 242 mg/dL    Estimated body mass index is 28.89 kg/m  as calculated from the following:    Height as of this encounter: 1.829 m (6').    Weight as of this encounter: 96.6 kg (213 lb).     Weight management plan: Discussed healthy diet and exercise guidelines     reports that he has never smoked. He has  never used smokeless tobacco.      Counseling Resources:  ATP IV Guidelines  Pooled Cohorts Equation Calculator  FRAX Risk Assessment  ICSI Preventive Guidelines  Dietary Guidelines for Americans, 2010  USDA's MyPlate  ASA Prophylaxis  Lung CA Screening    Lizet Zamora DO  [facility]

## 2020-02-20 ENCOUNTER — MYC MEDICAL ADVICE (OUTPATIENT)
Dept: FAMILY MEDICINE | Facility: CLINIC | Age: 50
End: 2020-02-20

## 2020-02-20 LAB
C TRACH DNA SPEC QL NAA+PROBE: NEGATIVE
HCV AB SERPL QL IA: NONREACTIVE
HIV 1+2 AB+HIV1 P24 AG SERPL QL IA: NONREACTIVE
N GONORRHOEA DNA SPEC QL NAA+PROBE: NEGATIVE
SPECIMEN SOURCE: NORMAL
SPECIMEN SOURCE: NORMAL
T PALLIDUM AB SER QL: NONREACTIVE

## 2020-02-20 NOTE — TELEPHONE ENCOUNTER
Routing to provider- Please see prior message from pt.    Please see MC message about dietary changes instead of taking statins.    Please let Nurse Triage know if we should do anything for follow up. Thank you!

## 2020-02-20 NOTE — TELEPHONE ENCOUNTER
We could certainly try diet/exercise first for the next few months, but it just looks like cholesterol is getting worse over time.  But we can hold off on statin for now.  If he would like to see a nutritionist, let me know.  That might help to answer a lot of his questions about what foods to eat more and less of

## 2020-03-02 ENCOUNTER — MYC MEDICAL ADVICE (OUTPATIENT)
Dept: FAMILY MEDICINE | Facility: CLINIC | Age: 50
End: 2020-03-02

## 2020-03-02 NOTE — TELEPHONE ENCOUNTER
Routing to provider- Please see prior message from pt.    Cholesterol questions     Please let Nurse Triage know if we should do anything for follow up. Thank you!

## 2020-03-03 ENCOUNTER — MYC MEDICAL ADVICE (OUTPATIENT)
Dept: FAMILY MEDICINE | Facility: CLINIC | Age: 50
End: 2020-03-03

## 2020-03-03 DIAGNOSIS — E66.3 OVERWEIGHT: ICD-10-CM

## 2020-03-03 DIAGNOSIS — E78.49 OTHER HYPERLIPIDEMIA: Primary | ICD-10-CM

## 2020-03-03 DIAGNOSIS — E78.01 FAMILIAL HYPERCHOLESTEREMIA: ICD-10-CM

## 2020-03-03 NOTE — TELEPHONE ENCOUNTER
Pt does mention he wants a calcium score. Please order this- is this different than a calcium level?

## 2020-03-03 NOTE — TELEPHONE ENCOUNTER
If he has not been trying to change diet before, then it could help lower his cholesterol.  But I cannot guarantee this.  Statin use could possibly be temporary while he works on healthy lifestyle changes.  However, if it's high cholesterol due to poor genetics, then it's possibly not temporary.   We could certainly get a calcium score, but even if it is normal, would still recommend low-dose statin and would still need to work on healthy lifestyle changes (diet, exercise, etc).

## 2020-04-13 ENCOUNTER — MYC MEDICAL ADVICE (OUTPATIENT)
Dept: FAMILY MEDICINE | Facility: CLINIC | Age: 50
End: 2020-04-13

## 2020-04-13 DIAGNOSIS — M25.561 ACUTE PAIN OF RIGHT KNEE: Primary | ICD-10-CM

## 2020-04-13 NOTE — TELEPHONE ENCOUNTER
Mago,    Do you want this done as virtual visit or does he need to go to Ortho?    Franny Lovelace, RN, BSN

## 2020-04-15 ENCOUNTER — OFFICE VISIT (OUTPATIENT)
Dept: ORTHOPEDICS | Facility: CLINIC | Age: 50
End: 2020-04-15
Payer: COMMERCIAL

## 2020-04-15 ENCOUNTER — ANCILLARY PROCEDURE (OUTPATIENT)
Dept: GENERAL RADIOLOGY | Facility: CLINIC | Age: 50
End: 2020-04-15
Attending: FAMILY MEDICINE
Payer: COMMERCIAL

## 2020-04-15 VITALS
HEIGHT: 72 IN | BODY MASS INDEX: 28.44 KG/M2 | DIASTOLIC BLOOD PRESSURE: 82 MMHG | SYSTOLIC BLOOD PRESSURE: 112 MMHG | WEIGHT: 210 LBS

## 2020-04-15 DIAGNOSIS — S76.311A HAMSTRING STRAIN, RIGHT, INITIAL ENCOUNTER: ICD-10-CM

## 2020-04-15 DIAGNOSIS — M25.561 ACUTE PAIN OF RIGHT KNEE: Primary | ICD-10-CM

## 2020-04-15 DIAGNOSIS — M25.561 ACUTE PAIN OF RIGHT KNEE: ICD-10-CM

## 2020-04-15 PROCEDURE — 73562 X-RAY EXAM OF KNEE 3: CPT

## 2020-04-15 PROCEDURE — 99204 OFFICE O/P NEW MOD 45 MIN: CPT | Performed by: FAMILY MEDICINE

## 2020-04-15 ASSESSMENT — MIFFLIN-ST. JEOR: SCORE: 1860.55

## 2020-04-15 NOTE — PROGRESS NOTES
[facility]   Clinic Visit s Apr 15, 2020    PCP: Mago Waters    ASSESSMENT/PLAN    ICD-10-CM    1. Acute pain of right knee  M25.561 XR Knee Standing AP Bilat [place] Bilat Lat Right   2. Hamstring strain, right, initial encounter  S76.311A      Acute hyperextension injury creating distal hamstring strain.  Reassurance, knee is otherwise stable.  No findings to suggest meniscus tear or significant ligamentous injury.  Discussed continued observation and continued activity as tolerated, but recommended hamstring strengthening, particular eccentric strengthening.  Gave individualized education regarding eccentric exercises including use of videos for demonstration.  Encouraged to call for PHYSICAL THERAPY referral for possible video visit if he is struggling or failing to resolve.  Follow-up as needed.    Today's Visit:  Quincy is a 49 year old male who is seen in consultation at the request of Dr. Waters for   Chief Complaint   Patient presents with     Right Knee - Pain       Injury: Patient describes injury as getting on exercise machine and possibly hyperextended knee.       Location of Pain: right knee posterior medial joint line, nonradiating   Duration of Pain: acute, 5 weeks,   Rating of Pain at worst: 6/10  Rating of Pain Currently: 4/10  Pain is better with: knee brace and rest   Pain is worse with: stairs, twisting, terminal knee flexion, biking  Treatment so far consists of: knee brace, activity avoidance and ice  Associated symptoms: no distal numbness or tingling; denies swelling or warmth  Recent imaging completed: No recent imaging completed.  Prior History of related problems: none    Social History: is employed as a/an       Past Medical History:   Diagnosis Date     Palpitations 2000    Had negative holter monitor and neg stress 2001       Patient Active Problem List    Diagnosis Date Noted     HSV-2 infection 02/19/2020     Priority: Medium      Impaired fasting glucose 02/19/2020     Priority: Medium     Chronic left shoulder pain 06/11/2016     Priority: Medium     Family history of diabetes mellitus 01/09/2013     Priority: Medium     Overweight 01/09/2013     Priority: Medium     Problem list name updated by automated process. Provider to review       Other hyperlipidemia 10/31/2010     Priority: Medium     Familial hypercholesteremia 09/16/2010     Priority: Medium       Family History   Problem Relation Age of Onset     Neurologic Disorder Mother         MS     Eye Disorder Mother         glaucoma     Breast Cancer Mother      Heart Disease Mother      Hypertension Mother      Substance Abuse Mother         Alcohol     Obesity Mother      Circulatory Father         phlebitis     Hyperlipidemia Father      Cancer Maternal Grandmother         breast     Breast Cancer Maternal Grandmother      Alzheimer Disease Maternal Grandfather      Cancer Paternal Grandfather         esphageal cancer     Other Cancer Paternal Grandfather         Esophagus     Neurologic Disorder Sister         MS     Obesity Sister      Thyroid Disease Sister      Cancer Paternal Grandmother         brain     Other Cancer Paternal Grandmother         Brain Cancer     Diabetes Sister      Diabetes Sister      Thyroid Disease Sister      Obesity Sister        Social History     Socioeconomic History     Marital status:      Spouse name: Juan F     Number of children: 1     Years of education: 20     Highest education level: None   Occupational History     Occupation: Self employed     Employer: OTHER   Social Needs     Financial resource strain: None     Food insecurity     Worry: None     Inability: None     Transportation needs     Medical: None     Non-medical: None   Tobacco Use     Smoking status: Never Smoker     Smokeless tobacco: Never Used   Substance and Sexual Activity     Alcohol use: Yes     Alcohol/week: 0.0 standard drinks     Comment: about 2 week     Drug  use: No       Past Surgical History:   Procedure Laterality Date     GENITOURINARY SURGERY  3/2/18    VAsectomy     HC REMOVAL OF TONSILS,12+ Y/O       ORTHOPEDIC SURGERY  5/13/18    Plate/screws used to repair broken collarbone             Review of Systems   Musculoskeletal: Positive for joint pain.   All other systems reviewed and are negative.        Physical Exam  Musculoskeletal:      Right knee: Tenderness found.   Neurological:      Gait: Gait is intact.       /82   Ht 1.829 m (6')   Wt 95.3 kg (210 lb)   BMI 28.48 kg/m    Constitutional:well-developed, well-nourished, and in no distress.   Cardiovascular: Intact distal pulses.    Neurological: alert. Gait Normal:   Gait, station, stance, and balance appear normal for age  Skin: Skin is warm and dry.   Psychiatric: Mood and affect normal.   Respiratory: unlabored, speaks in full sentences  Lymph: no LAD, no lymphangitis        Knee Musculoskeletal Exam    Gait      Gait is normal.    Inspection      Leg length disparity: no discrepancy      Inspection - Right        Erythema: none        Effusion: none        Edema: none        Ecchymosis: none        Deformity: none      Palpation      Palpation - Right        Masses: none        Crepitus: none        Tenderness: present          Medial hamstring: mild          Medial hamstring comment: distal hamstring just above and below medial joint line    Range of Motion      Range of Motion - Right        Right knee range of motion is normal and full.      Strength      Strength - Right        Right knee strength is normal.      Instability      Instability Signs - Right        Varus stress grade: normal      Valgus stress grade: normal      Posterior drawer: normal      Medial Shanta test: negative      Lateral Shanta test: positive      Lachman: negative    Neurovascular      Neurovascular - Right        Right knee neurovascular exam is normal.        Capillary refill: well-perfused    Special  Signs      Special Signs - Right Knee        Straight leg raise: normal      J sign: none        Patellar apprehension: none            X-ray images Ordered and independently reviewed by me in the office today with the patient. X-ray shows:   Recent Results (from the past 48 hour(s))   XR Knee Standing AP Bilat [place] Bilat Lat Right    Narrative    RIGHT KNEE THREE VIEWS AND LEFT KNEE TWO VIEWS  4/15/2020 11:22 AM    HISTORY:  Acute pain of right knee.    COMPARISON:  None.    FINDINGS:  No fracture or osseous lesion is seen. The joint spaces are  well preserved. No soft tissue pathology is seen.        Impression    IMPRESSION:  Unremarkable examination.      DALE AGUSTIN MD

## 2020-04-15 NOTE — LETTER
4/15/2020         RE: Quincy Harrell  [address] [place] [address]        Dear Colleague,    Thank you for referring your patient, Quincy Harrell, to the [facility]. Please see a copy of my visit note below.    [facility]   Clinic Visit s Apr 15, 2020    PCP: Mago Waters    ASSESSMENT/PLAN    ICD-10-CM    1. Acute pain of right knee  M25.561 XR Knee Standing AP Bilat [place] Bilat Lat Right   2. Hamstring strain, right, initial encounter  S76.311A      Acute hyperextension injury creating distal hamstring strain.  Reassurance, knee is otherwise stable.  No findings to suggest meniscus tear or significant ligamentous injury.  Discussed continued observation and continued activity as tolerated, but recommended hamstring strengthening, particular eccentric strengthening.  Gave individualized education regarding eccentric exercises including use of videos for demonstration.  Encouraged to call for PHYSICAL THERAPY referral for possible video visit if he is struggling or failing to resolve.  Follow-up as needed.    Today's Visit:  Quincy is a 49 year old male who is seen in consultation at the request of Dr. Waters for   Chief Complaint   Patient presents with     Right Knee - Pain       Injury: Patient describes injury as getting on exercise machine and possibly hyperextended knee.       Location of Pain: right knee posterior medial joint line, nonradiating   Duration of Pain: acute, 5 weeks,   Rating of Pain at worst: 6/10  Rating of Pain Currently: 4/10  Pain is better with: knee brace and rest   Pain is worse with: stairs, twisting, terminal knee flexion, biking  Treatment so far consists of: knee brace, activity avoidance and ice  Associated symptoms: no distal numbness or tingling; denies swelling or warmth  Recent imaging completed: No recent imaging completed.  Prior History of related problems: none    Social  History: is employed as a/an       Past Medical History:   Diagnosis Date     Palpitations 2000    Had negative holter monitor and neg stress 2001       Patient Active Problem List    Diagnosis Date Noted     HSV-2 infection 02/19/2020     Priority: Medium     Impaired fasting glucose 02/19/2020     Priority: Medium     Chronic left shoulder pain 06/11/2016     Priority: Medium     Family history of diabetes mellitus 01/09/2013     Priority: Medium     Overweight 01/09/2013     Priority: Medium     Problem list name updated by automated process. Provider to review       Other hyperlipidemia 10/31/2010     Priority: Medium     Familial hypercholesteremia 09/16/2010     Priority: Medium       Family History   Problem Relation Age of Onset     Neurologic Disorder Mother         MS     Eye Disorder Mother         glaucoma     Breast Cancer Mother      Heart Disease Mother      Hypertension Mother      Substance Abuse Mother         Alcohol     Obesity Mother      Circulatory Father         phlebitis     Hyperlipidemia Father      Cancer Maternal Grandmother         breast     Breast Cancer Maternal Grandmother      Alzheimer Disease Maternal Grandfather      Cancer Paternal Grandfather         esphageal cancer     Other Cancer Paternal Grandfather         Esophagus     Neurologic Disorder Sister         MS     Obesity Sister      Thyroid Disease Sister      Cancer Paternal Grandmother         brain     Other Cancer Paternal Grandmother         Brain Cancer     Diabetes Sister      Diabetes Sister      Thyroid Disease Sister      Obesity Sister        Social History     Socioeconomic History     Marital status:      Spouse name: Juan F     Number of children: 1     Years of education: 20     Highest education level: None   Occupational History     Occupation: Self employed     Employer: OTHER   Social Needs     Financial resource strain: None     Food insecurity     Worry: None     Inability: None      Transportation needs     Medical: None     Non-medical: None   Tobacco Use     Smoking status: Never Smoker     Smokeless tobacco: Never Used   Substance and Sexual Activity     Alcohol use: Yes     Alcohol/week: 0.0 standard drinks     Comment: about 2 week     Drug use: No       Past Surgical History:   Procedure Laterality Date     GENITOURINARY SURGERY  3/2/18    VAsectomy     HC REMOVAL OF TONSILS,12+ Y/O       ORTHOPEDIC SURGERY  5/13/18    Plate/screws used to repair broken collarbone             Review of Systems   Musculoskeletal: Positive for joint pain.   All other systems reviewed and are negative.        Physical Exam  Musculoskeletal:      Right knee: Tenderness found.   Neurological:      Gait: Gait is intact.       /82   Ht 1.829 m (6')   Wt 95.3 kg (210 lb)   BMI 28.48 kg/m    Constitutional:well-developed, well-nourished, and in no distress.   Cardiovascular: Intact distal pulses.    Neurological: alert. Gait Normal:   Gait, station, stance, and balance appear normal for age  Skin: Skin is warm and dry.   Psychiatric: Mood and affect normal.   Respiratory: unlabored, speaks in full sentences  Lymph: no LAD, no lymphangitis        Knee Musculoskeletal Exam    Gait      Gait is normal.    Inspection      Leg length disparity: no discrepancy      Inspection - Right        Erythema: none        Effusion: none        Edema: none        Ecchymosis: none        Deformity: none      Palpation      Palpation - Right        Masses: none        Crepitus: none        Tenderness: present          Medial hamstring: mild          Medial hamstring comment: distal hamstring just above and below medial joint line    Range of Motion      Range of Motion - Right        Right knee range of motion is normal and full.      Strength      Strength - Right        Right knee strength is normal.      Instability      Instability Signs - Right        Varus stress grade: normal      Valgus stress grade: normal       Posterior drawer: normal      Medial Shanta test: negative      Lateral Shanta test: positive      Lachman: negative    Neurovascular      Neurovascular - Right        Right knee neurovascular exam is normal.        Capillary refill: well-perfused    Special Signs      Special Signs - Right Knee        Straight leg raise: normal      J sign: none        Patellar apprehension: none            X-ray images Ordered and independently reviewed by me in the office today with the patient. X-ray shows:   Recent Results (from the past 48 hour(s))   XR Knee Standing AP Bilat [place] Bilat Lat Right    Narrative    RIGHT KNEE THREE VIEWS AND LEFT KNEE TWO VIEWS  4/15/2020 11:22 AM    HISTORY:  Acute pain of right knee.    COMPARISON:  None.    FINDINGS:  No fracture or osseous lesion is seen. The joint spaces are  well preserved. No soft tissue pathology is seen.        Impression    IMPRESSION:  Unremarkable examination.      DALE AGUSTIN MD       Again, thank you for allowing me to participate in the care of your patient.        Sincerely,        Yohannes Guerin MD

## 2020-04-15 NOTE — PATIENT INSTRUCTIONS
[email or web address]      Eccentric hamstring strengthening exercise     [facility]

## 2020-10-23 ENCOUNTER — TRANSFERRED RECORDS (OUTPATIENT)
Dept: HEALTH INFORMATION MANAGEMENT | Facility: CLINIC | Age: 50
End: 2020-10-23

## 2020-11-16 ENCOUNTER — HEALTH MAINTENANCE LETTER (OUTPATIENT)
Age: 50
End: 2020-11-16

## 2020-12-29 ASSESSMENT — ENCOUNTER SYMPTOMS
WEAKNESS: 0
HEMATURIA: 0
MYALGIAS: 0
NERVOUS/ANXIOUS: 0
FEVER: 0
NAUSEA: 0
DYSURIA: 0
ABDOMINAL PAIN: 0
DIZZINESS: 0
EYE PAIN: 0
PALPITATIONS: 0
HEADACHES: 0
SHORTNESS OF BREATH: 0
HEMATOCHEZIA: 0
PARESTHESIAS: 0
JOINT SWELLING: 0
CHILLS: 0
FREQUENCY: 0
SORE THROAT: 0
ARTHRALGIAS: 1
DIARRHEA: 0
HEARTBURN: 0
COUGH: 0
CONSTIPATION: 0

## 2020-12-31 ENCOUNTER — OFFICE VISIT (OUTPATIENT)
Dept: FAMILY MEDICINE | Facility: CLINIC | Age: 50
End: 2020-12-31
Payer: COMMERCIAL

## 2020-12-31 VITALS
BODY MASS INDEX: 27.77 KG/M2 | DIASTOLIC BLOOD PRESSURE: 72 MMHG | OXYGEN SATURATION: 97 % | TEMPERATURE: 97.5 F | SYSTOLIC BLOOD PRESSURE: 117 MMHG | HEART RATE: 57 BPM | HEIGHT: 72 IN | WEIGHT: 205 LBS | RESPIRATION RATE: 16 BRPM

## 2020-12-31 DIAGNOSIS — Z00.00 ROUTINE GENERAL MEDICAL EXAMINATION AT A HEALTH CARE FACILITY: Primary | ICD-10-CM

## 2020-12-31 DIAGNOSIS — M79.674 GREAT TOE PAIN, RIGHT: ICD-10-CM

## 2020-12-31 DIAGNOSIS — Z23 NEED FOR PROPHYLACTIC VACCINATION AND INOCULATION AGAINST INFLUENZA: ICD-10-CM

## 2020-12-31 DIAGNOSIS — E78.49 OTHER HYPERLIPIDEMIA: ICD-10-CM

## 2020-12-31 DIAGNOSIS — E78.01 FAMILIAL HYPERCHOLESTEREMIA: ICD-10-CM

## 2020-12-31 LAB
ALBUMIN SERPL-MCNC: 4.1 G/DL (ref 3.4–5)
ALP SERPL-CCNC: 83 U/L (ref 40–150)
ALT SERPL W P-5'-P-CCNC: 49 U/L (ref 0–70)
ANION GAP SERPL CALCULATED.3IONS-SCNC: 5 MMOL/L (ref 3–14)
AST SERPL W P-5'-P-CCNC: 22 U/L (ref 0–45)
BASOPHILS # BLD AUTO: 0 10E9/L (ref 0–0.2)
BASOPHILS NFR BLD AUTO: 0.3 %
BILIRUB SERPL-MCNC: 0.4 MG/DL (ref 0.2–1.3)
BUN SERPL-MCNC: 13 MG/DL (ref 7–30)
CALCIUM SERPL-MCNC: 8.9 MG/DL (ref 8.5–10.1)
CHLORIDE SERPL-SCNC: 108 MMOL/L (ref 94–109)
CHOLEST SERPL-MCNC: 266 MG/DL
CO2 SERPL-SCNC: 26 MMOL/L (ref 20–32)
CREAT SERPL-MCNC: 0.88 MG/DL (ref 0.66–1.25)
DIFFERENTIAL METHOD BLD: NORMAL
EOSINOPHIL # BLD AUTO: 0.2 10E9/L (ref 0–0.7)
EOSINOPHIL NFR BLD AUTO: 2.8 %
ERYTHROCYTE [DISTWIDTH] IN BLOOD BY AUTOMATED COUNT: 12.6 % (ref 10–15)
GFR SERPL CREATININE-BSD FRML MDRD: >90 ML/MIN/{1.73_M2}
GLUCOSE SERPL-MCNC: 97 MG/DL (ref 70–99)
HBA1C MFR BLD: 5.5 % (ref 0–5.6)
HCT VFR BLD AUTO: 44.1 % (ref 40–53)
HDLC SERPL-MCNC: 44 MG/DL
HGB BLD-MCNC: 15.2 G/DL (ref 13.3–17.7)
LDLC SERPL CALC-MCNC: 187 MG/DL
LYMPHOCYTES # BLD AUTO: 2.6 10E9/L (ref 0.8–5.3)
LYMPHOCYTES NFR BLD AUTO: 42.2 %
MCH RBC QN AUTO: 32.1 PG (ref 26.5–33)
MCHC RBC AUTO-ENTMCNC: 34.5 G/DL (ref 31.5–36.5)
MCV RBC AUTO: 93 FL (ref 78–100)
MONOCYTES # BLD AUTO: 0.4 10E9/L (ref 0–1.3)
MONOCYTES NFR BLD AUTO: 7 %
NEUTROPHILS # BLD AUTO: 2.9 10E9/L (ref 1.6–8.3)
NEUTROPHILS NFR BLD AUTO: 47.7 %
NONHDLC SERPL-MCNC: 222 MG/DL
PLATELET # BLD AUTO: 231 10E9/L (ref 150–450)
POTASSIUM SERPL-SCNC: 4.1 MMOL/L (ref 3.4–5.3)
PROT SERPL-MCNC: 7.5 G/DL (ref 6.8–8.8)
PSA SERPL-ACNC: 0.53 UG/L (ref 0–4)
RBC # BLD AUTO: 4.73 10E12/L (ref 4.4–5.9)
SODIUM SERPL-SCNC: 139 MMOL/L (ref 133–144)
TRIGL SERPL-MCNC: 173 MG/DL
TSH SERPL DL<=0.005 MIU/L-ACNC: 1.55 MU/L (ref 0.4–4)
URATE SERPL-MCNC: 5.9 MG/DL (ref 3.5–7.2)
WBC # BLD AUTO: 6 10E9/L (ref 4–11)

## 2020-12-31 PROCEDURE — 90686 IIV4 VACC NO PRSV 0.5 ML IM: CPT | Performed by: PHYSICIAN ASSISTANT

## 2020-12-31 PROCEDURE — 36415 COLL VENOUS BLD VENIPUNCTURE: CPT | Performed by: PHYSICIAN ASSISTANT

## 2020-12-31 PROCEDURE — 80050 GENERAL HEALTH PANEL: CPT | Performed by: PHYSICIAN ASSISTANT

## 2020-12-31 PROCEDURE — 83036 HEMOGLOBIN GLYCOSYLATED A1C: CPT | Performed by: PHYSICIAN ASSISTANT

## 2020-12-31 PROCEDURE — 82306 VITAMIN D 25 HYDROXY: CPT | Performed by: PHYSICIAN ASSISTANT

## 2020-12-31 PROCEDURE — G0103 PSA SCREENING: HCPCS | Performed by: PHYSICIAN ASSISTANT

## 2020-12-31 PROCEDURE — 99396 PREV VISIT EST AGE 40-64: CPT | Mod: 25 | Performed by: PHYSICIAN ASSISTANT

## 2020-12-31 PROCEDURE — 84550 ASSAY OF BLOOD/URIC ACID: CPT | Performed by: PHYSICIAN ASSISTANT

## 2020-12-31 PROCEDURE — 99213 OFFICE O/P EST LOW 20 MIN: CPT | Mod: 25 | Performed by: PHYSICIAN ASSISTANT

## 2020-12-31 PROCEDURE — 90471 IMMUNIZATION ADMIN: CPT | Performed by: PHYSICIAN ASSISTANT

## 2020-12-31 PROCEDURE — 80061 LIPID PANEL: CPT | Performed by: PHYSICIAN ASSISTANT

## 2020-12-31 ASSESSMENT — ENCOUNTER SYMPTOMS
HEARTBURN: 0
MYALGIAS: 0
SHORTNESS OF BREATH: 0
PARESTHESIAS: 0
DYSURIA: 0
JOINT SWELLING: 0
COUGH: 0
FREQUENCY: 0
HEMATURIA: 0
WEAKNESS: 0
HEMATOCHEZIA: 0
NERVOUS/ANXIOUS: 0
SORE THROAT: 0
EYE PAIN: 0
HEADACHES: 0
PALPITATIONS: 0
FEVER: 0
ARTHRALGIAS: 1
ABDOMINAL PAIN: 0
DIARRHEA: 0
CONSTIPATION: 0
NAUSEA: 0
DIZZINESS: 0
CHILLS: 0

## 2020-12-31 ASSESSMENT — MIFFLIN-ST. JEOR: SCORE: 1832.87

## 2020-12-31 NOTE — PATIENT INSTRUCTIONS
- Avoid meats such as liver and kidney. These have high purine levels and contribute to high blood levels of uric acid.  - Limit serving sizes of beef, lamb, and pork  - Avoid large portions of anchovies, shellfish, sardines, and tuna. These foods are also high in purines.   - You can eat high purine vegetables such as asparagus and spinach; these have not been found to increase the incidence of gout flares.  - Beer and distilled liquors are associated with an increased risk of gout or gout flares. Moderate consumption of wine does not appear to increase the risk of gout. Avoid alcohol during gout flares and limit alcohol between attacks.   - Limit or avoid sugar sweetened foods such as sweetened cereals, bakery goods, and candies.  - Some research suggests that drinking coffee in moderation may be associated with reduced risk of gout.   - There is some research that suggests eating cherries may reduce the incidence of gout flares.  - There is some research that suggests vitamin C may lower risk of gout flares.

## 2020-12-31 NOTE — PROGRESS NOTES
SUBJECTIVE:   CC: Quincy Harrell is an 50 year old male who presents for preventative health visit.   Patient has been advised of split billing requirements and indicates understanding: Yes  Healthy Habits:     Getting at least 3 servings of Calcium per day:  Yes    Bi-annual eye exam:  Yes    Dental care twice a year:  Yes    Sleep apnea or symptoms of sleep apnea:  None    Diet:  Regular (no restrictions)    Frequency of exercise:  4-5 days/week    Duration of exercise:  30-45 minutes    Taking medications regularly:  Yes    Medication side effects:  Not applicable    PHQ-2 Total Score: 0    Additional concerns today:  No    Will is a 50 year old male with a history of hyperlipidemia presenting for routine physical  New patient to me today  Not feeling great about health  With pandemic alcohol use increased, was having wine with dinner and a couple cocktails later  Started to notice at the gym he felt a little hungover   Yesterday gave away all the alcohol in his house  Wants to make some healthy changes     He has history of elevated cholesterol readings  Reports there is potentially a familial component to this   Interested in initiating statin therapy  Had CT calcium score of 33 done through [facility] system     Past 6-8 weeks right great toe painful  No history of trauma  Has been affecting his gait   Has been improving recently  Sister does have history of gout       Today's PHQ-2 Score:   PHQ-2 ( 1999 Pfizer) 12/29/2020   Q1: Little interest or pleasure in doing things 0   Q2: Feeling down, depressed or hopeless 0   PHQ-2 Score 0   Q1: Little interest or pleasure in doing things Not at all   Q2: Feeling down, depressed or hopeless Not at all   PHQ-2 Score 0       Abuse: Current or Past(Physical, Sexual or Emotional)- No  Do you feel safe in your environment? Yes        Social History     Tobacco Use     Smoking status: Never Smoker     Smokeless tobacco: Never Used   Substance Use Topics     Alcohol  use: Yes     Alcohol/week: 0.0 standard drinks     Comment: was drinking daily until 12/27 when I emptied house of alc.         Alcohol Use 12/29/2020   Prescreen: >3 drinks/day or >7 drinks/week? Yes   Prescreen: >3 drinks/day or >7 drinks/week? -   AUDIT SCORE  9       Last PSA: No results found for: PSA    Reviewed orders with patient. Reviewed health maintenance and updated orders accordingly - Yes      Reviewed and updated as needed this visit by clinical staff  Tobacco  Allergies    Med Hx  Surg Hx  Fam Hx  Soc Hx        Reviewed and updated as needed this visit by Provider                    Review of Systems   Constitutional: Negative for chills and fever.   HENT: Positive for hearing loss. Negative for congestion, ear pain and sore throat.    Eyes: Negative for pain and visual disturbance.   Respiratory: Negative for cough and shortness of breath.    Cardiovascular: Positive for chest pain. Negative for palpitations and peripheral edema.   Gastrointestinal: Negative for abdominal pain, constipation, diarrhea, heartburn, hematochezia and nausea.   Genitourinary: Negative for discharge, dysuria, frequency, genital sores, hematuria, impotence and urgency.   Musculoskeletal: Positive for arthralgias. Negative for joint swelling and myalgias.   Skin: Negative for rash.   Neurological: Negative for dizziness, weakness, headaches and paresthesias.   Psychiatric/Behavioral: Negative for mood changes. The patient is not nervous/anxious.        OBJECTIVE:   /72   Pulse 57   Temp 97.5  F (36.4  C) (Oral)   Resp 16   Ht 1.829 m (6')   Wt 93 kg (205 lb)   SpO2 97%   BMI 27.80 kg/m      Physical Exam  GENERAL: healthy, alert and no distress  EYES: Eyes grossly normal to inspection, PERRL and conjunctivae and sclerae normal  HENT: ear canals and TM's normal, nose and mouth without ulcers or lesions  NECK: no adenopathy, no asymmetry, masses, or scars and thyroid normal to palpation  RESP: lungs clear to  auscultation - no rales, rhonchi or wheezes  CV: regular rate and rhythm, normal S1 S2, no S3 or S4, no murmur, click or rub, no peripheral edema and peripheral pulses strong  ABDOMEN: soft, nontender, no hepatosplenomegaly, no masses and bowel sounds normal  MS: no gross musculoskeletal defects noted, no edema. Mild tenderness distal right great toe. No redness or warmth.   SKIN: no suspicious lesions or rashes  NEURO: Normal strength and tone, mentation intact and speech normal  PSYCH: mentation appears normal, affect normal/bright    Diagnostic Test Results:  Labs reviewed in Epic  No results found for this or any previous visit (from the past 24 hour(s)).    ASSESSMENT/PLAN:   Quincy was seen today for physical.    Diagnoses and all orders for this visit:    Routine general medical examination at a health care facility  -     PSA, screen  -     Comprehensive metabolic panel (BMP + Alb, Alk Phos, ALT, AST, Total. Bili, TP)  -     Hemoglobin A1c  -     CBC with platelets and differential  -     Lipid panel reflex to direct LDL Non-fasting  -     Vitamin D Deficiency  -     TSH with free T4 reflex  -     GASTROENTEROLOGY ADULT REF PROCEDURE ONLY; Future    Other hyperlipidemia  -     REFERRAL TO [facility]    Familial hypercholesteremia  -     REFERRAL TO [facility]    Great toe pain, right  -     Uric acid        Given CT calcium score of 33 and LDL-C of 187 will start patient on Lipitor 20 mg daily. Recheck lipids in 3 months. Referral to [facility].     Suspect atraumatic right toe pain could be related to gout but uric acid today normal. He has been making healthier choices which has coincided with resolution of toe pain. Continue to monitor. Exam today normal without redness or warmth, normal ROM.       Patient has been advised of split billing requirements and indicates understanding: Yes  COUNSELING:    Reviewed preventive health counseling, as reflected in patient instructions       Regular exercise       Healthy diet/nutrition       Immunizations    Vaccinated for: Influenza             Alcohol Use        Colon cancer screening       Prostate cancer screening    Estimated body mass index is 27.8 kg/m  as calculated from the following:    Height as of this encounter: 1.829 m (6').    Weight as of this encounter: 93 kg (205 lb).     Weight management plan: Discussed healthy diet and exercise guidelines    He reports that he has never smoked. He has never used smokeless tobacco.      Counseling Resources:  ATP IV Guidelines  Pooled Cohorts Equation Calculator  FRAX Risk Assessment  ICSI Preventive Guidelines  Dietary Guidelines for Americans, 2010  USDA's MyPlate  ASA Prophylaxis  Lung CA Screening    River Rodriguez PA-C  [facility]

## 2021-01-04 LAB — DEPRECATED CALCIDIOL+CALCIFEROL SERPL-MC: 29 UG/L (ref 20–75)

## 2021-01-05 RX ORDER — ATORVASTATIN CALCIUM 20 MG/1
20 TABLET, FILM COATED ORAL DAILY
Qty: 90 TABLET | Refills: 1 | Status: SHIPPED | OUTPATIENT
Start: 2021-01-05 | End: 2021-07-07

## 2021-08-19 RX ORDER — ACYCLOVIR 400 MG/1
TABLET ORAL
COMMUNITY
Start: 2021-04-11 | End: 2023-02-21

## 2021-08-20 ENCOUNTER — OFFICE VISIT (OUTPATIENT)
Dept: FAMILY MEDICINE | Facility: CLINIC | Age: 51
End: 2021-08-20
Payer: COMMERCIAL

## 2021-08-20 VITALS
HEART RATE: 55 BPM | WEIGHT: 205 LBS | RESPIRATION RATE: 16 BRPM | BODY MASS INDEX: 27.77 KG/M2 | SYSTOLIC BLOOD PRESSURE: 116 MMHG | TEMPERATURE: 97.7 F | DIASTOLIC BLOOD PRESSURE: 82 MMHG | HEIGHT: 72 IN | OXYGEN SATURATION: 98 %

## 2021-08-20 DIAGNOSIS — Z00.00 ROUTINE GENERAL MEDICAL EXAMINATION AT A HEALTH CARE FACILITY: Primary | ICD-10-CM

## 2021-08-20 DIAGNOSIS — R07.89 CHEST DISCOMFORT: ICD-10-CM

## 2021-08-20 DIAGNOSIS — R53.83 FATIGUE, UNSPECIFIED TYPE: ICD-10-CM

## 2021-08-20 DIAGNOSIS — E78.49 OTHER HYPERLIPIDEMIA: ICD-10-CM

## 2021-08-20 LAB
ALBUMIN SERPL-MCNC: 4.2 G/DL (ref 3.4–5)
ALP SERPL-CCNC: 87 U/L (ref 40–150)
ALT SERPL W P-5'-P-CCNC: 51 U/L (ref 0–70)
ANION GAP SERPL CALCULATED.3IONS-SCNC: 3 MMOL/L (ref 3–14)
AST SERPL W P-5'-P-CCNC: 21 U/L (ref 0–45)
BASOPHILS # BLD AUTO: 0 10E3/UL (ref 0–0.2)
BASOPHILS NFR BLD AUTO: 0 %
BILIRUB SERPL-MCNC: 0.6 MG/DL (ref 0.2–1.3)
BUN SERPL-MCNC: 16 MG/DL (ref 7–30)
CALCIUM SERPL-MCNC: 9.2 MG/DL (ref 8.5–10.1)
CHLORIDE BLD-SCNC: 106 MMOL/L (ref 94–109)
CHOLEST SERPL-MCNC: 208 MG/DL
CO2 SERPL-SCNC: 30 MMOL/L (ref 20–32)
CREAT SERPL-MCNC: 0.99 MG/DL (ref 0.66–1.25)
EOSINOPHIL # BLD AUTO: 0.1 10E3/UL (ref 0–0.7)
EOSINOPHIL NFR BLD AUTO: 2 %
ERYTHROCYTE [DISTWIDTH] IN BLOOD BY AUTOMATED COUNT: 12.5 % (ref 10–15)
FASTING STATUS PATIENT QL REPORTED: YES
GFR SERPL CREATININE-BSD FRML MDRD: 88 ML/MIN/1.73M2
GLUCOSE BLD-MCNC: 103 MG/DL (ref 70–99)
HCT VFR BLD AUTO: 44.9 % (ref 40–53)
HDLC SERPL-MCNC: 45 MG/DL
HGB BLD-MCNC: 15.5 G/DL (ref 13.3–17.7)
IMM GRANULOCYTES # BLD: 0 10E3/UL
IMM GRANULOCYTES NFR BLD: 0 %
LDLC SERPL CALC-MCNC: 126 MG/DL
LYMPHOCYTES # BLD AUTO: 2 10E3/UL (ref 0.8–5.3)
LYMPHOCYTES NFR BLD AUTO: 38 %
MCH RBC QN AUTO: 31.4 PG (ref 26.5–33)
MCHC RBC AUTO-ENTMCNC: 34.5 G/DL (ref 31.5–36.5)
MCV RBC AUTO: 91 FL (ref 78–100)
MONOCYTES # BLD AUTO: 0.3 10E3/UL (ref 0–1.3)
MONOCYTES NFR BLD AUTO: 7 %
NEUTROPHILS # BLD AUTO: 2.8 10E3/UL (ref 1.6–8.3)
NEUTROPHILS NFR BLD AUTO: 53 %
NONHDLC SERPL-MCNC: 163 MG/DL
PLATELET # BLD AUTO: 226 10E3/UL (ref 150–450)
POTASSIUM BLD-SCNC: 4 MMOL/L (ref 3.4–5.3)
PROT SERPL-MCNC: 7.7 G/DL (ref 6.8–8.8)
RBC # BLD AUTO: 4.93 10E6/UL (ref 4.4–5.9)
SODIUM SERPL-SCNC: 139 MMOL/L (ref 133–144)
TRIGL SERPL-MCNC: 184 MG/DL
WBC # BLD AUTO: 5.3 10E3/UL (ref 4–11)

## 2021-08-20 PROCEDURE — 84270 ASSAY OF SEX HORMONE GLOBUL: CPT | Mod: 59 | Performed by: PHYSICIAN ASSISTANT

## 2021-08-20 PROCEDURE — 99396 PREV VISIT EST AGE 40-64: CPT | Mod: 25 | Performed by: PHYSICIAN ASSISTANT

## 2021-08-20 PROCEDURE — 84403 ASSAY OF TOTAL TESTOSTERONE: CPT | Performed by: PHYSICIAN ASSISTANT

## 2021-08-20 PROCEDURE — 84270 ASSAY OF SEX HORMONE GLOBUL: CPT | Performed by: PHYSICIAN ASSISTANT

## 2021-08-20 PROCEDURE — 80061 LIPID PANEL: CPT | Performed by: PHYSICIAN ASSISTANT

## 2021-08-20 PROCEDURE — 80053 COMPREHEN METABOLIC PANEL: CPT | Performed by: PHYSICIAN ASSISTANT

## 2021-08-20 PROCEDURE — 90471 IMMUNIZATION ADMIN: CPT | Performed by: PHYSICIAN ASSISTANT

## 2021-08-20 PROCEDURE — 85025 COMPLETE CBC W/AUTO DIFF WBC: CPT | Performed by: PHYSICIAN ASSISTANT

## 2021-08-20 PROCEDURE — 90750 HZV VACC RECOMBINANT IM: CPT | Performed by: PHYSICIAN ASSISTANT

## 2021-08-20 PROCEDURE — 36415 COLL VENOUS BLD VENIPUNCTURE: CPT | Performed by: PHYSICIAN ASSISTANT

## 2021-08-20 ASSESSMENT — ENCOUNTER SYMPTOMS
DIARRHEA: 0
HEMATOCHEZIA: 0
SHORTNESS OF BREATH: 0
MYALGIAS: 0
CONSTIPATION: 0
HEARTBURN: 0
PARESTHESIAS: 0
HEMATURIA: 0
ABDOMINAL PAIN: 0
DIZZINESS: 0
NAUSEA: 0
PALPITATIONS: 0
CHILLS: 0
ARTHRALGIAS: 0
FEVER: 0
JOINT SWELLING: 0
SORE THROAT: 0
NERVOUS/ANXIOUS: 1
COUGH: 0
DYSURIA: 0
FREQUENCY: 0
EYE PAIN: 0
HEADACHES: 0
WEAKNESS: 0

## 2021-08-20 ASSESSMENT — MIFFLIN-ST. JEOR: SCORE: 1827.87

## 2021-08-20 NOTE — PATIENT INSTRUCTIONS
To schedule Zio Patch attachment call [phone]        Preventive Health Recommendations  Male Ages 50 - 64    Yearly exam:             See your health care provider every year in order to  o   Review health changes.   o   Discuss preventive care.    o   Review your medicines if your doctor has prescribed any.     Have a cholesterol test every 5 years, or more frequently if you are at risk for high cholesterol/heart disease.     Have a diabetes test (fasting glucose) every three years. If you are at risk for diabetes, you should have this test more often.     Have a colonoscopy at age 50, or have a yearly FIT test (stool test). These exams will check for colon cancer.      Talk with your health care provider about whether or not a prostate cancer screening test (PSA) is right for you.    You should be tested each year for STDs (sexually transmitted diseases), if you re at risk.     Shots: Get a flu shot each year. Get a tetanus shot every 10 years.     Nutrition:    Eat at least 5 servings of fruits and vegetables daily.     Eat whole-grain bread, whole-wheat pasta and brown rice instead of white grains and rice.     Get adequate Calcium and Vitamin D.     Lifestyle    Exercise for at least 150 minutes a week (30 minutes a day, 5 days a week). This will help you control your weight and prevent disease.     Limit alcohol to one drink per day.     No smoking.     Wear sunscreen to prevent skin cancer.     See your dentist every six months for an exam and cleaning.     See your eye doctor every 1 to 2 years.

## 2021-08-20 NOTE — NURSING NOTE
Prior to immunization administration, verified patients identity using patient s name and date of birth. Please see Immunization Activity for additional information.     Screening Questionnaire for Adult Immunization    Are you sick today?   No   Do you have allergies to medications, food, a vaccine component or latex?   No   Have you ever had a serious reaction after receiving a vaccination?   No   Do you have a long-term health problem with heart, lung, kidney, or metabolic disease (e.g., diabetes), asthma, a blood disorder, no spleen, complement component deficiency, a cochlear implant, or a spinal fluid leak?  Are you on long-term aspirin therapy?   No   Do you have cancer, leukemia, HIV/AIDS, or any other immune system problem?   No   Do you have a parent, brother, or sister with an immune system problem?   No   In the past 3 months, have you taken medications that affect  your immune system, such as prednisone, other steroids, or anticancer drugs; drugs for the treatment of rheumatoid arthritis, Crohn s disease, or psoriasis; or have you had radiation treatments?   No   Have you had a seizure, or a brain or other nervous system problem?   No   During the past year, have you received a transfusion of blood or blood    products, or been given immune (gamma) globulin or antiviral drug?   No   For women: Are you pregnant or is there a chance you could become       pregnant during the next month?   No   Have you received any vaccinations in the past 4 weeks?   No     Immunization questionnaire answers were all negative.        Per orders of Dr. Rodriguez, injection of Shingrix given by Carmita Michael MA. Patient instructed to remain in clinic for 15 minutes afterwards, and to report any adverse reaction to me immediately.       Screening performed by Carmita Michael MA on 8/20/2021 at 9:18 AM.

## 2021-08-20 NOTE — PROGRESS NOTES
"SUBJECTIVE:   CC: Quincy Harrell is an 51 year old male who presents for preventative health visit.   Healthy Habits:     Getting at least 3 servings of Calcium per day:  NO    Bi-annual eye exam:  Yes    Dental care twice a year:  Yes    Sleep apnea or symptoms of sleep apnea:  Daytime drowsiness    Diet:  Regular (no restrictions)    Frequency of exercise:  2-3 days/week    Duration of exercise:  Less than 15 minutes    Taking medications regularly:  Yes    Medication side effects:  Other    PHQ-2 Total Score: 1    Additional concerns today:  Yes    Started on statin end of last year. Thinks since that time has not felt like himself. No muscle aches. Just feels like energy level is down a notch. Drowsy.     Exercise has been diminished since July. Helping with some aches and pains but finds when not exercising as much affects mood.    Planning [facility] evaluation later this year. Has some occasional chest tightness mainly mid sternum. Also some times has felt a little \"floaty\" but not lightheaded. No palpitations. No chest pain on exertion. He is a  -- dive trips to [facility] this year.     Stress has been higher. Finds he adela with this by being a little more reclusive. Sleep is OK, but sometimes wakes up earlier than normal with urgency to tackle tasks of day. No snoring. No gasping for air.     Low back pain - follows with chiropractor periodically.       Today's PHQ-2 Score:   PHQ-2 ( 1999 Pfizer) 8/20/2021   Q1: Little interest or pleasure in doing things 1   Q2: Feeling down, depressed or hopeless 0   PHQ-2 Score 1   Q1: Little interest or pleasure in doing things Several days   Q2: Feeling down, depressed or hopeless Not at all   PHQ-2 Score 1       Abuse: Current or Past(Physical, Sexual or Emotional)- No  Do you feel safe in your environment? Yes    Have you ever done Advance Care Planning? (For example, a Health Directive, POLST, or a discussion with a medical " provider or your loved ones about your wishes): No, advance care planning information given to patient to review.  Patient plans to discuss their wishes with loved ones or provider.      Social History     Tobacco Use     Smoking status: Never Smoker     Smokeless tobacco: Never Used   Substance Use Topics     Alcohol use: Yes     Alcohol/week: 0.0 standard drinks     Comment: was drinking daily until 12/27 when I emptied house of alc.     If you drink alcohol do you typically have >3 drinks per day or >7 drinks per week? Yes      Alcohol Use 8/20/2021   Prescreen: >3 drinks/day or >7 drinks/week? No   Prescreen: >3 drinks/day or >7 drinks/week? -   AUDIT SCORE  -     AUDIT - Alcohol Use Disorders Identification Test - Reproduced from the World Health Organization Audit 2001 (Second Edition) 12/29/2020   1.  How often do you have a drink containing alcohol? 4 or more times a week   2.  How many drinks containing alcohol do you have on a typical day when you are drinking? 1 or 2   3.  How often do you have five or more drinks on one occasion? Never   4.  How often during the last year have you found that you were not able to stop drinking once you had started? Never   5.  How often during the last year have you failed to do what was normally expected of you because of drinking? Never   6.  How often during the last year have you needed a first drink in the morning to get yourself going after a heavy drinking session? Never   7.  How often during the last year have you had a feeling of guilt or remorse after drinking? Less than monthly   8.  How often during the last year have you been unable to remember what happened the night before because of your drinking? Never   9.  Have you or someone else been injured because of your drinking? No   10. Has a relative, friend, doctor or other health care worker been concerned about your drinking or suggested you cut down? Yes, during the last year   TOTAL SCORE 9       Last  PSA:   PSA   Date Value Ref Range Status   12/31/2020 0.53 0 - 4 ug/L Final     Comment:     Assay Method:  Chemiluminescence using Siemens Vista analyzer       Reviewed orders with patient. Reviewed health maintenance and updated orders accordingly - Yes      Reviewed and updated as needed this visit by clinical staff  Tobacco  Allergies    Med Hx  Surg Hx  Fam Hx  Soc Hx        Reviewed and updated as needed this visit by Provider                    Review of Systems   Constitutional: Negative for chills and fever.   HENT: Negative for congestion, ear pain, hearing loss and sore throat.    Eyes: Negative for pain and visual disturbance.   Respiratory: Negative for cough and shortness of breath.    Cardiovascular: Negative for chest pain, palpitations and peripheral edema.   Gastrointestinal: Negative for abdominal pain, constipation, diarrhea, heartburn, hematochezia and nausea.   Genitourinary: Negative for discharge, dysuria, frequency, genital sores, hematuria, impotence and urgency.   Musculoskeletal: Negative for arthralgias, joint swelling and myalgias.   Skin: Negative for rash.   Neurological: Negative for dizziness, weakness, headaches and paresthesias.   Psychiatric/Behavioral: Positive for mood changes. The patient is nervous/anxious.      OBJECTIVE:   /82 (BP Location: Left arm, Patient Position: Sitting, Cuff Size: Adult Regular)   Pulse 55   Temp 97.7  F (36.5  C) (Temporal)   Resp 16   Ht 1.829 m (6')   Wt 93 kg (205 lb)   SpO2 98%   BMI 27.80 kg/m      Physical Exam  GENERAL: healthy, alert and no distress  EYES: Eyes grossly normal to inspection, PERRL and conjunctivae and sclerae normal  HENT: ear canals and TM's normal, nose and mouth without ulcers or lesions  NECK: no adenopathy, no asymmetry, masses, or scars and thyroid normal to palpation  RESP: lungs clear to auscultation - no rales, rhonchi or wheezes  CV: regular rate and rhythm, normal S1 S2, no S3 or S4, no murmur,  click or rub, no peripheral edema and peripheral pulses strong  ABDOMEN: soft, nontender, no hepatosplenomegaly, no masses and bowel sounds normal  MS: no gross musculoskeletal defects noted, no edema  SKIN: no suspicious lesions or rashes  NEURO: Normal strength and tone, mentation intact and speech normal  PSYCH: mentation appears normal, affect normal/bright    Diagnostic Test Results:  Labs reviewed in Epic  Results for orders placed or performed in visit on 08/20/21   CBC with platelets and differential     Status: None    Narrative    The following orders were created for panel order CBC with platelets and differential.  Procedure                               Abnormality         Status                     ---------                               -----------         ------                     CBC with platelets and d...[[id number]]                      Final result                 Please view results for these tests on the individual orders.   Lipid panel reflex to direct LDL Fasting     Status: Abnormal   Result Value Ref Range    Cholesterol 208 (H) <200 mg/dL    Triglycerides 184 (H) <150 mg/dL    Direct Measure HDL 45 >=40 mg/dL    LDL Cholesterol Calculated 126 (H) <=100 mg/dL    Non HDL Cholesterol 163 (H) <130 mg/dL    Patient Fasting > 8hrs? Yes    Comprehensive metabolic panel (BMP + Alb, Alk Phos, ALT, AST, Total. Bili, TP)     Status: Abnormal   Result Value Ref Range    Sodium 139 133 - 144 mmol/L    Potassium 4.0 3.4 - 5.3 mmol/L    Chloride 106 94 - 109 mmol/L    Carbon Dioxide (CO2) 30 20 - 32 mmol/L    Anion Gap 3 3 - 14 mmol/L    Urea Nitrogen 16 7 - 30 mg/dL    Creatinine 0.99 0.66 - 1.25 mg/dL    Calcium 9.2 8.5 - 10.1 mg/dL    Glucose 103 (H) 70 - 99 mg/dL    Alkaline Phosphatase 87 40 - 150 U/L    AST 21 0 - 45 U/L    ALT 51 0 - 70 U/L    Protein Total 7.7 6.8 - 8.8 g/dL    Albumin 4.2 3.4 - 5.0 g/dL    Bilirubin Total 0.6 0.2 - 1.3 mg/dL    GFR Estimate 88 >60 mL/min/1.73m2   CBC with  platelets and differential     Status: None   Result Value Ref Range    WBC Count 5.3 4.0 - 11.0 10e3/uL    RBC Count 4.93 4.40 - 5.90 10e6/uL    Hemoglobin 15.5 13.3 - 17.7 g/dL    Hematocrit 44.9 40.0 - 53.0 %    MCV 91 78 - 100 fL    MCH 31.4 26.5 - 33.0 pg    MCHC 34.5 31.5 - 36.5 g/dL    RDW 12.5 10.0 - 15.0 %    Platelet Count 226 150 - 450 10e3/uL    % Neutrophils 53 %    % Lymphocytes 38 %    % Monocytes 7 %    % Eosinophils 2 %    % Basophils 0 %    % Immature Granulocytes 0 %    Absolute Neutrophils 2.8 1.6 - 8.3 10e3/uL    Absolute Lymphocytes 2.0 0.8 - 5.3 10e3/uL    Absolute Monocytes 0.3 0.0 - 1.3 10e3/uL    Absolute Eosinophils 0.1 0.0 - 0.7 10e3/uL    Absolute Basophils 0.0 0.0 - 0.2 10e3/uL    Absolute Immature Granulocytes 0.0 <=0.0 10e3/uL   Testosterone Free and Total     Status: None (In process)    Narrative    The following orders were created for panel order Testosterone Free and Total.  Procedure                               Abnormality         Status                     ---------                               -----------         ------                     Sex Hormone Binding Glob...[[id number]]                      In process                 Testosterone Free and Total[[id number]]                      In process                   Please view results for these tests on the individual orders.       ASSESSMENT/PLAN:   Quincy was seen today for physical and recheck medication.    Diagnoses and all orders for this visit:    Routine general medical examination at a health care facility  -     REVIEW OF HEALTH MAINTENANCE PROTOCOL ORDERS  -     Comprehensive metabolic panel (BMP + Alb, Alk Phos, ALT, AST, Total. Bili, TP); Future  -     Lipid panel reflex to direct LDL Fasting; Future  -     CBC with platelets and differential; Future  -     Adult Gastro Ref - Procedure Only; Future  -     Testosterone Free and Total; Future  -     ZOSTER VACCINE RECOMBINANT ADJUVANTED IM NJX  -     CBC with  platelets and differential  -     Lipid panel reflex to direct LDL Fasting  -     Comprehensive metabolic panel (BMP + Alb, Alk Phos, ALT, AST, Total. Bili, TP)  -     Testosterone Free and Total    Chest discomfort  -     Leadless EKG Monitor 8 to 14 Days; Future    Other hyperlipidemia  -     rosuvastatin (CRESTOR) 20 MG tablet; Take 1 tablet (20 mg) by mouth daily    Fatigue, unspecified type  -     Comprehensive metabolic panel (BMP + Alb, Alk Phos, ALT, AST, Total. Bili, TP); Future  -     CBC with platelets and differential; Future  -     Testosterone Free and Total; Future  -     CBC with platelets and differential  -     Comprehensive metabolic panel (BMP + Alb, Alk Phos, ALT, AST, Total. Bili, TP)  -     Testosterone Free and Total  -     rosuvastatin (CRESTOR) 20 MG tablet; Take 1 tablet (20 mg) by mouth daily      Chest discomfort could be costochondritis vs stress/anxiety vs cardiac vs GERD. We discussed obtaining Zio Patch x 14 days which Will is interested in pursuing. Also has follow up with [facility] later this year. Work on stress reduction, time for self care, regular exercise. Thinks may be a correlation between feeling off and starting Lipitor 20 mg daily last fall. No muscle aches or cramps. Will switch to alternative statin Crestor to see if provides any relief. Routine labs pending.     COUNSELING:   Reviewed preventive health counseling, as reflected in patient instructions       Regular exercise       Healthy diet/nutrition       Immunizations    Vaccinated for: Zoster             Colon cancer screening    Estimated body mass index is 27.8 kg/m  as calculated from the following:    Height as of this encounter: 1.829 m (6').    Weight as of this encounter: 93 kg (205 lb).      He reports that he has never smoked. He has never used smokeless tobacco.      Counseling Resources:  ATP IV Guidelines  Pooled Cohorts Equation Calculator  FRAX Risk Assessment  ICSI Preventive  Guidelines  Dietary Guidelines for Americans, 2010  USDA's MyPlate  ASA Prophylaxis  Lung CA Screening    River Rodriguez PA-C  [facility]

## 2021-08-22 RX ORDER — ROSUVASTATIN CALCIUM 20 MG/1
20 TABLET, COATED ORAL DAILY
Qty: 90 TABLET | Refills: 3 | Status: SHIPPED | OUTPATIENT
Start: 2021-08-22 | End: 2022-11-16

## 2021-08-23 LAB — SHBG SERPL-SCNC: 28 NMOL/L (ref 11–80)

## 2021-08-24 ENCOUNTER — ANCILLARY PROCEDURE (OUTPATIENT)
Dept: CARDIOLOGY | Facility: CLINIC | Age: 51
End: 2021-08-24
Attending: PHYSICIAN ASSISTANT
Payer: COMMERCIAL

## 2021-08-24 DIAGNOSIS — R07.89 CHEST DISCOMFORT: ICD-10-CM

## 2021-08-24 LAB
SHBG SERPL-SCNC: 28 NMOL/L (ref 11–80)
TESTOST FREE SERPL-MCNC: 7.49 NG/DL
TESTOST SERPL-MCNC: 342 NG/DL (ref 240–950)

## 2021-08-24 PROCEDURE — 93248 EXT ECG>7D<15D REV&INTERPJ: CPT | Performed by: INTERNAL MEDICINE

## 2021-08-24 PROCEDURE — 93246 EXT ECG>7D<15D RECORDING: CPT

## 2021-08-24 NOTE — PROGRESS NOTES
Per NANCI Bingham, patient to have 14 Day Zio Patch monitor placed.  Diagnosis: R07.89, Chest discomfort  Monitor placed: Yes  Patient Instructed: Yes  Patient verbalized understanding: Yes  Holter # [id number]    Monitor placed by Rickey Estrada CMA  7:19 AM

## 2021-08-24 NOTE — LETTER
September 27, 2021      Quincy Harrell  [address] [place] [address]        Hi Will,     Good news -- your heart monitor showed no significant arrhythmias. Your events corresponded to normal sinus rhythm. This is reassuring. I will ask our staff to mail you a copy of the report.     Let me know if any questions.       Best,       River Rodriguez

## 2021-08-30 ENCOUNTER — TRANSFERRED RECORDS (OUTPATIENT)
Dept: HEALTH INFORMATION MANAGEMENT | Facility: CLINIC | Age: 51
End: 2021-08-30

## 2021-09-12 ENCOUNTER — HEALTH MAINTENANCE LETTER (OUTPATIENT)
Age: 51
End: 2021-09-12

## 2021-10-04 DIAGNOSIS — E66.3 OVERWEIGHT: ICD-10-CM

## 2021-10-04 DIAGNOSIS — R73.01 IMPAIRED FASTING GLUCOSE: ICD-10-CM

## 2021-10-04 DIAGNOSIS — E78.01 FAMILIAL HYPERCHOLESTEREMIA: Primary | ICD-10-CM

## 2022-03-01 ENCOUNTER — ALLIED HEALTH/NURSE VISIT (OUTPATIENT)
Dept: FAMILY MEDICINE | Facility: CLINIC | Age: 52
End: 2022-03-01
Payer: COMMERCIAL

## 2022-03-01 DIAGNOSIS — Z23 ENCOUNTER FOR IMMUNIZATION: Primary | ICD-10-CM

## 2022-03-01 PROCEDURE — 90750 HZV VACC RECOMBINANT IM: CPT

## 2022-03-01 PROCEDURE — 90472 IMMUNIZATION ADMIN EACH ADD: CPT

## 2022-03-01 PROCEDURE — 90471 IMMUNIZATION ADMIN: CPT

## 2022-03-01 PROCEDURE — 90686 IIV4 VACC NO PRSV 0.5 ML IM: CPT

## 2022-10-17 ENCOUNTER — LAB (OUTPATIENT)
Dept: FAMILY MEDICINE | Facility: CLINIC | Age: 52
End: 2022-10-17
Payer: COMMERCIAL

## 2022-10-17 ENCOUNTER — VIRTUAL VISIT (OUTPATIENT)
Dept: FAMILY MEDICINE | Facility: CLINIC | Age: 52
End: 2022-10-17
Payer: COMMERCIAL

## 2022-10-17 DIAGNOSIS — H10.9 BACTERIAL CONJUNCTIVITIS OF BOTH EYES: ICD-10-CM

## 2022-10-17 DIAGNOSIS — J06.9 UPPER RESPIRATORY TRACT INFECTION, UNSPECIFIED TYPE: ICD-10-CM

## 2022-10-17 DIAGNOSIS — J06.9 UPPER RESPIRATORY TRACT INFECTION, UNSPECIFIED TYPE: Primary | ICD-10-CM

## 2022-10-17 DIAGNOSIS — B96.89 BACTERIAL CONJUNCTIVITIS OF BOTH EYES: ICD-10-CM

## 2022-10-17 LAB
DEPRECATED S PYO AG THROAT QL EIA: NEGATIVE
GROUP A STREP BY PCR: NOT DETECTED

## 2022-10-17 PROCEDURE — U0003 INFECTIOUS AGENT DETECTION BY NUCLEIC ACID (DNA OR RNA); SEVERE ACUTE RESPIRATORY SYNDROME CORONAVIRUS 2 (SARS-COV-2) (CORONAVIRUS DISEASE [COVID-19]), AMPLIFIED PROBE TECHNIQUE, MAKING USE OF HIGH THROUGHPUT TECHNOLOGIES AS DESCRIBED BY CMS-2020-01-R: HCPCS

## 2022-10-17 PROCEDURE — 99213 OFFICE O/P EST LOW 20 MIN: CPT | Mod: 95 | Performed by: FAMILY MEDICINE

## 2022-10-17 PROCEDURE — 87651 STREP A DNA AMP PROBE: CPT

## 2022-10-17 PROCEDURE — U0005 INFEC AGEN DETEC AMPLI PROBE: HCPCS

## 2022-10-17 RX ORDER — POLYMYXIN B SULFATE AND TRIMETHOPRIM 1; 10000 MG/ML; [USP'U]/ML
SOLUTION OPHTHALMIC
Qty: 10 ML | Refills: 0 | Status: SHIPPED | OUTPATIENT
Start: 2022-10-17 | End: 2023-02-21

## 2022-10-17 NOTE — PROGRESS NOTES
Kel is a 52 year old who is being evaluated via a billable video visit.      How would you like to obtain your AVS? MyChart  If the video visit is dropped, the invitation should be resent by: Send to e-mail at: [email or web address]  Will anyone else be joining your video visit? No      Video-Visit Details    Video Start Time: 10:49 am    Type of service:  Video Visit    Video End Time:11:03 AM    Originating Location (pt. Location): Home        Distant Location (provider location):  On-site    Platform used for Video Visit: Conductiv    Problem List Items Addressed This Visit     Upper respiratory tract infection, unspecified type - Primary     He has new pharyngitis with illness so r/o strep / COVID since last test was done at beginning of illness.  Symptomatic treatment- push fluids/ rest.         Relevant Orders    REVIEW OF HEALTH MAINTENANCE PROTOCOL ORDERS (Completed)    Streptococcus A Rapid Screen w/Reflex to PCR - Clinic Collect    Symptomatic; Unknown COVID-19 Virus (Coronavirus) by PCR    Bacterial conjunctivitis of both eyes    Relevant Medications    trimethoprim-polymyxin b (POLYTRIM) 08817-4.1 UNIT/ML-% ophthalmic solution          Subjective   Kel is a 52 year old who presents for the following health issues   Chief Complaint   Patient presents with     Pharyngitis     S.T., red eyes, H/A cough x a week ago, COVID home test a week ago was negative.      History of Present Illness       Reason for visit:  Pink eye, sore throat (strep?)  Symptom onset:  3-7 days ago  Symptoms include:  Sore throat, red eyes, headache, cough, malaise  Symptom intensity:  Moderate  Symptom progression:  Staying the same  Had these symptoms before:  No  What makes it better:  Taking dayquil/nyquil. Eyedrops    He eats 2-3 servings of fruits and vegetables daily.He consumes 0 sweetened beverage(s) daily.He exercises with enough effort to increase his heart rate 10 to 19 minutes per day.  He exercises with enough effort to  increase his heart rate 3 or less days per week.   He is taking medications regularly.       He started to have headache/ sneezing/ slight cough and malaise 1 weeks ago.  He did continue to work with symptoms and they persisted but did not get worse.  He did a home COVID test 1 week ago that was negative.    3 days ago had thick mucus in eyes with redness in both eyes and slight itching / cloudy vision.  Discharge gets crusty.  One eye was worse at first then other eye got symptoms.  No exposure to children but girlfriend teaches 2nd grade.      Last night he developed a sore throat - has sharp pain in throat.  Feels slightly warm.  Did not check temperature.    Review of Systems   No diarrhea / nausea.  No shortness of breath or wheezing.    Patient Active Problem List   Diagnosis     Familial hypercholesteremia     Other hyperlipidemia     Family history of diabetes mellitus     Overweight     Chronic left shoulder pain     HSV-2 infection     Impaired fasting glucose     Hamstring strain, right, initial encounter     Upper respiratory tract infection, unspecified type     Bacterial conjunctivitis of both eyes          Objective           Vitals:  No vitals were obtained today due to virtual visit.    Physical Exam     GENERAL: Healthy, alert and no distress  EYES: Eyes grossly normal to inspection and conjunctiva/corneas- conjunctival injection each eye.  No obvious discharge.  RESP: No audible wheeze, cough, or visible cyanosis.  No visible retractions or increased work of breathing.  SKIN: Visible skin clear. No significant rash, abnormal pigmentation or lesions.  NEURO: Cranial nerves grossly intact.  Mentation and speech appropriate for age.  PSYCH: Mentation appears normal, affect normal/bright, judgement and insight intact, normal speech and appearance well-groomed.            Elizabeth Marks MD

## 2022-10-17 NOTE — ASSESSMENT & PLAN NOTE
He has new pharyngitis with illness so r/o strep / COVID since last test was done at beginning of illness.  Symptomatic treatment- push fluids/ rest.

## 2022-10-18 LAB — SARS-COV-2 RNA RESP QL NAA+PROBE: NEGATIVE

## 2022-11-14 DIAGNOSIS — E78.49 OTHER HYPERLIPIDEMIA: ICD-10-CM

## 2022-11-14 DIAGNOSIS — R53.83 FATIGUE, UNSPECIFIED TYPE: ICD-10-CM

## 2022-11-16 ENCOUNTER — MYC MEDICAL ADVICE (OUTPATIENT)
Dept: FAMILY MEDICINE | Facility: CLINIC | Age: 52
End: 2022-11-16

## 2022-11-16 RX ORDER — ROSUVASTATIN CALCIUM 20 MG/1
20 TABLET, COATED ORAL DAILY
Qty: 90 TABLET | Refills: 0 | Status: SHIPPED | OUTPATIENT
Start: 2022-11-16 | End: 2023-02-21

## 2022-11-16 NOTE — TELEPHONE ENCOUNTER
Routing refill request to provider for review/approval because:  --Ordered by Michael only so far.  --Last visit:  8/20/21 Michael.  --Future Visit: none.      ,  --Please contact patient and ask to schedule an annual preventive/physical.    --This request has been routed to provider to authorize.               --Last Written Prescription Date:     Disp Refills Start End REENA   rosuvastatin (CRESTOR) 20 MG tablet 90 tablet 3 8/22/2021  --   Sig - Route: Take 1 tablet (20 mg) by mouth daily - Oral   Patient not taking: Reported on 10/17/2022

## 2022-11-16 NOTE — TELEPHONE ENCOUNTER
QBInternational message sent to patient.    PARKER Archer, RN-BC  [facility]

## 2022-11-19 ENCOUNTER — HEALTH MAINTENANCE LETTER (OUTPATIENT)
Age: 52
End: 2022-11-19

## 2023-02-21 ENCOUNTER — OFFICE VISIT (OUTPATIENT)
Dept: FAMILY MEDICINE | Facility: CLINIC | Age: 53
End: 2023-02-21
Payer: COMMERCIAL

## 2023-02-21 VITALS
TEMPERATURE: 98.4 F | HEIGHT: 72 IN | BODY MASS INDEX: 28.77 KG/M2 | SYSTOLIC BLOOD PRESSURE: 148 MMHG | WEIGHT: 212.44 LBS | HEART RATE: 64 BPM | OXYGEN SATURATION: 98 % | RESPIRATION RATE: 16 BRPM | DIASTOLIC BLOOD PRESSURE: 92 MMHG

## 2023-02-21 DIAGNOSIS — R73.01 ELEVATED FASTING GLUCOSE: ICD-10-CM

## 2023-02-21 DIAGNOSIS — Z78.9 HEPATITIS B VACCINATION STATUS UNKNOWN: ICD-10-CM

## 2023-02-21 DIAGNOSIS — M25.561 CHRONIC PAIN OF RIGHT KNEE: ICD-10-CM

## 2023-02-21 DIAGNOSIS — E78.49 OTHER HYPERLIPIDEMIA: ICD-10-CM

## 2023-02-21 DIAGNOSIS — B00.9 HSV-2 INFECTION: ICD-10-CM

## 2023-02-21 DIAGNOSIS — Z11.3 ROUTINE SCREENING FOR STI (SEXUALLY TRANSMITTED INFECTION): ICD-10-CM

## 2023-02-21 DIAGNOSIS — G89.29 CHRONIC PAIN OF RIGHT KNEE: ICD-10-CM

## 2023-02-21 DIAGNOSIS — Z12.5 SCREENING FOR PROSTATE CANCER: ICD-10-CM

## 2023-02-21 DIAGNOSIS — R53.83 FATIGUE, UNSPECIFIED TYPE: ICD-10-CM

## 2023-02-21 DIAGNOSIS — R03.0 ELEVATED BP WITHOUT DIAGNOSIS OF HYPERTENSION: ICD-10-CM

## 2023-02-21 DIAGNOSIS — Z00.00 ENCOUNTER FOR ROUTINE ADULT HEALTH EXAMINATION WITHOUT ABNORMAL FINDINGS: Primary | ICD-10-CM

## 2023-02-21 PROBLEM — Z96.651 CHRONIC KNEE PAIN AFTER TOTAL REPLACEMENT OF RIGHT KNEE JOINT: Status: ACTIVE | Noted: 2020-04-15

## 2023-02-21 PROBLEM — H10.9 BACTERIAL CONJUNCTIVITIS OF BOTH EYES: Status: RESOLVED | Noted: 2022-10-17 | Resolved: 2023-02-21

## 2023-02-21 PROBLEM — B96.89 BACTERIAL CONJUNCTIVITIS OF BOTH EYES: Status: RESOLVED | Noted: 2022-10-17 | Resolved: 2023-02-21

## 2023-02-21 PROBLEM — J06.9 UPPER RESPIRATORY TRACT INFECTION, UNSPECIFIED TYPE: Status: RESOLVED | Noted: 2022-10-17 | Resolved: 2023-02-21

## 2023-02-21 PROCEDURE — 36415 COLL VENOUS BLD VENIPUNCTURE: CPT | Performed by: FAMILY MEDICINE

## 2023-02-21 PROCEDURE — 86780 TREPONEMA PALLIDUM: CPT | Performed by: FAMILY MEDICINE

## 2023-02-21 PROCEDURE — 87491 CHLMYD TRACH DNA AMP PROBE: CPT | Performed by: FAMILY MEDICINE

## 2023-02-21 PROCEDURE — 86706 HEP B SURFACE ANTIBODY: CPT | Performed by: FAMILY MEDICINE

## 2023-02-21 PROCEDURE — 99000 SPECIMEN HANDLING OFFICE-LAB: CPT | Performed by: FAMILY MEDICINE

## 2023-02-21 PROCEDURE — 99396 PREV VISIT EST AGE 40-64: CPT | Performed by: FAMILY MEDICINE

## 2023-02-21 PROCEDURE — 87591 N.GONORRHOEAE DNA AMP PROB: CPT | Performed by: FAMILY MEDICINE

## 2023-02-21 PROCEDURE — 86592 SYPHILIS TEST NON-TREP QUAL: CPT | Performed by: FAMILY MEDICINE

## 2023-02-21 PROCEDURE — 87340 HEPATITIS B SURFACE AG IA: CPT | Performed by: FAMILY MEDICINE

## 2023-02-21 PROCEDURE — 86780 TREPONEMA PALLIDUM: CPT | Mod: 90 | Performed by: FAMILY MEDICINE

## 2023-02-21 PROCEDURE — 87389 HIV-1 AG W/HIV-1&-2 AB AG IA: CPT | Performed by: FAMILY MEDICINE

## 2023-02-21 RX ORDER — ACYCLOVIR 800 MG/1
800 TABLET ORAL
Qty: 90 TABLET | Refills: 1 | Status: SHIPPED | OUTPATIENT
Start: 2023-02-21 | End: 2024-09-16

## 2023-02-21 RX ORDER — ROSUVASTATIN CALCIUM 20 MG/1
20 TABLET, COATED ORAL DAILY
Qty: 90 TABLET | Refills: 3 | Status: SHIPPED | OUTPATIENT
Start: 2023-02-21 | End: 2023-07-15

## 2023-02-21 ASSESSMENT — ENCOUNTER SYMPTOMS
NAUSEA: 0
FREQUENCY: 0
MYALGIAS: 0
PALPITATIONS: 0
DIZZINESS: 0
ARTHRALGIAS: 0
NERVOUS/ANXIOUS: 0
ABDOMINAL PAIN: 0
JOINT SWELLING: 0
HEMATURIA: 0
CONSTIPATION: 0
SHORTNESS OF BREATH: 0
DIARRHEA: 0
EYE PAIN: 0
WEAKNESS: 0
FEVER: 0
PARESTHESIAS: 0
HEADACHES: 0
CHILLS: 0
COUGH: 0
HEMATOCHEZIA: 0
HEARTBURN: 0
SORE THROAT: 0
DYSURIA: 0

## 2023-02-21 NOTE — ASSESSMENT & PLAN NOTE
This patient presents for preventative exam.  He desires STD testing.  We reviewed nutrition as in the past, he has loosely met criteria for metabolic syndrome.  He generally cooks his own food.  Given his family history of diabetes, I believe he is at risk for conditions associated with insulin resistance.  I recommended further reduction in carbohydrates/sugars while focusing on protein and vegetables.  He has been on cholesterol-lowering medications in the past although is not currently.  He will return to clinic for fasting lab work.  Screening for diabetes.  He believes he has had hepatitis B vaccine and we will confirm with antibody titers.  No changes to plan.  We discussed chronic knee pain for which I would refer him back to orthopedics if he continues to be symptomatic.  He also describes some thumb pain.

## 2023-02-22 ENCOUNTER — LAB (OUTPATIENT)
Dept: LAB | Facility: CLINIC | Age: 53
End: 2023-02-22
Payer: COMMERCIAL

## 2023-02-22 DIAGNOSIS — R73.01 ELEVATED FASTING GLUCOSE: ICD-10-CM

## 2023-02-22 DIAGNOSIS — B00.9 HSV-2 INFECTION: ICD-10-CM

## 2023-02-22 DIAGNOSIS — R03.0 ELEVATED BP WITHOUT DIAGNOSIS OF HYPERTENSION: ICD-10-CM

## 2023-02-22 DIAGNOSIS — Z12.5 SCREENING FOR PROSTATE CANCER: ICD-10-CM

## 2023-02-22 DIAGNOSIS — E78.49 OTHER HYPERLIPIDEMIA: ICD-10-CM

## 2023-02-22 LAB
ALT SERPL W P-5'-P-CCNC: 24 U/L (ref 10–50)
ANION GAP SERPL CALCULATED.3IONS-SCNC: 13 MMOL/L (ref 7–15)
BUN SERPL-MCNC: 19.1 MG/DL (ref 6–20)
C TRACH DNA SPEC QL NAA+PROBE: NEGATIVE
CALCIUM SERPL-MCNC: 10 MG/DL (ref 8.6–10)
CHLORIDE SERPL-SCNC: 102 MMOL/L (ref 98–107)
CHOLEST SERPL-MCNC: 283 MG/DL
CREAT SERPL-MCNC: 1.03 MG/DL (ref 0.67–1.17)
DEPRECATED HCO3 PLAS-SCNC: 25 MMOL/L (ref 22–29)
GFR SERPL CREATININE-BSD FRML MDRD: 88 ML/MIN/1.73M2
GLUCOSE SERPL-MCNC: 105 MG/DL (ref 70–99)
HBA1C MFR BLD: 5.5 % (ref 0–5.6)
HBV SURFACE AB SERPL IA-ACNC: 0.57 M[IU]/ML
HBV SURFACE AB SERPL IA-ACNC: NONREACTIVE M[IU]/ML
HBV SURFACE AG SERPL QL IA: NONREACTIVE
HDLC SERPL-MCNC: 43 MG/DL
HIV 1+2 AB+HIV1 P24 AG SERPL QL IA: NONREACTIVE
LDLC SERPL CALC-MCNC: 199 MG/DL
N GONORRHOEA DNA SPEC QL NAA+PROBE: NEGATIVE
NONHDLC SERPL-MCNC: 240 MG/DL
POTASSIUM SERPL-SCNC: 4.3 MMOL/L (ref 3.4–5.3)
PSA SERPL-MCNC: 0.49 NG/ML (ref 0–3.5)
RPR SER QL: NONREACTIVE
SODIUM SERPL-SCNC: 140 MMOL/L (ref 136–145)
T PALLIDUM AB SER QL: REACTIVE
TRIGL SERPL-MCNC: 207 MG/DL

## 2023-02-22 PROCEDURE — 36415 COLL VENOUS BLD VENIPUNCTURE: CPT

## 2023-02-22 PROCEDURE — 80061 LIPID PANEL: CPT

## 2023-02-22 PROCEDURE — G0103 PSA SCREENING: HCPCS

## 2023-02-22 PROCEDURE — 80048 BASIC METABOLIC PNL TOTAL CA: CPT

## 2023-02-22 PROCEDURE — 84460 ALANINE AMINO (ALT) (SGPT): CPT

## 2023-02-22 PROCEDURE — 83036 HEMOGLOBIN GLYCOSYLATED A1C: CPT

## 2023-02-23 DIAGNOSIS — E78.01 FAMILIAL HYPERCHOLESTEREMIA: Primary | ICD-10-CM

## 2023-02-23 DIAGNOSIS — E78.49 OTHER HYPERLIPIDEMIA: ICD-10-CM

## 2023-02-25 LAB — T PALLIDUM AB SER QL AGGL: REACTIVE

## 2023-03-01 DIAGNOSIS — A52.8 LATE LATENT SYPHILIS: Primary | ICD-10-CM

## 2023-03-01 NOTE — PROGRESS NOTES
Late latent syphilis  This patient presented for screening.  He was aware that 1 partner had exposure to syphilis and specifically asked for syphilis testing.  As far as he knows, he had no symptoms including genital lesions or symptoms of neurosyphilis.  The [facility] lab reverse sequence screening algorithm was initiated with positive treponema antibody.  RPR was nonreactive.  Then, Treponema pallidum antibody by TP-PA was reactive which implies syphilis.  His symptoms fit with late latent syphilis and we will treat him accordingly with 2,400,000 units Bicillin-LA x3 weeks.

## 2023-03-01 NOTE — ASSESSMENT & PLAN NOTE
This patient presented for screening.  He was aware that 1 partner had exposure to syphilis and specifically asked for syphilis testing.  As far as he knows, he had no symptoms including genital lesions or symptoms of neurosyphilis.  The [facility] lab reverse sequence screening algorithm was initiated with positive treponema antibody.  RPR was nonreactive.  Then, Treponema pallidum antibody by TP-PA was reactive which implies syphilis.  His symptoms fit with late latent syphilis and we will treat him accordingly with 2,400,000 units Bicillin-LA x3 weeks.

## 2023-03-02 ENCOUNTER — TELEPHONE (OUTPATIENT)
Dept: URGENT CARE | Facility: URGENT CARE | Age: 53
End: 2023-03-02
Payer: COMMERCIAL

## 2023-03-02 NOTE — PROGRESS NOTES
Patient returned call. Informed of message below and that patient will need nurse only appointment to receive these weekly injections x3 weeks. Patient verbalized understanding.     Strength of ordered med not available at this clinic, informed he will need two injections of the 1,200,000. All [facility] clinics carry this strength and order is in chart so patient can be scheduled for nurse only visits at any [facility] location that is convenient for him. Transferred to [facility] line pre patient request.

## 2023-03-02 NOTE — TELEPHONE ENCOUNTER
Familia AMAYA from [facility] called to verify we have Bicillin 1.2 or 2.4 for treatment of syphilis.     Patient to get Bicillin injections 1x weekly for 3 weeks using 2.4. Location: ventrogluteal (anterolateral thigh).    Writer called patient to set-up appointments on nurse schedule. Patient had already called and set-up appointment on nurse schedule for tomorrow, Friday.     PARKER Mario RN  [facility]

## 2023-03-02 NOTE — PROGRESS NOTES
Left message to call back for: patient   Information to relay to patient: transfer to [facility] RN line

## 2023-03-02 NOTE — TELEPHONE ENCOUNTER
Reason for Call:  Appointment Request    Patient requesting this type of appt:  With nurse for penicillin G benzathine injection      Requested provider: Nurse     Reason patient unable to be scheduled: [facility] unable to find nurse schedule to assist in scheduling    When does patient want to be seen/preferred time: At your earliest convenience     Comments: Pt called requesting for appointment with nurse to get penicillin G benzathine injection done. Please follow back up with pt to assist in scheduling at your earliest convenience. Thank you.      Could we send this information to you in [employer] or would you prefer to receive a phone call?:   Patient would prefer a phone call   Okay to leave a detailed message?: Yes at Cell number on file:    Telephone Information:   Mobile [phone]       Call taken on 3/2/2023 at 8:47 AM by Remberto Cruz

## 2023-03-03 ENCOUNTER — ALLIED HEALTH/NURSE VISIT (OUTPATIENT)
Dept: FAMILY MEDICINE | Facility: CLINIC | Age: 53
End: 2023-03-03
Payer: COMMERCIAL

## 2023-03-03 DIAGNOSIS — A52.8 LATE LATENT SYPHILIS: Primary | ICD-10-CM

## 2023-03-03 PROCEDURE — 99207 PR NO CHARGE NURSE ONLY: CPT

## 2023-03-03 PROCEDURE — 96372 THER/PROPH/DIAG INJ SC/IM: CPT | Performed by: FAMILY MEDICINE

## 2023-03-13 ENCOUNTER — ALLIED HEALTH/NURSE VISIT (OUTPATIENT)
Dept: FAMILY MEDICINE | Facility: CLINIC | Age: 53
End: 2023-03-13
Payer: COMMERCIAL

## 2023-03-13 DIAGNOSIS — A52.8 LATE LATENT SYPHILIS: Primary | ICD-10-CM

## 2023-03-13 PROCEDURE — 96372 THER/PROPH/DIAG INJ SC/IM: CPT | Performed by: FAMILY MEDICINE

## 2023-03-13 PROCEDURE — 99207 PR NO CHARGE NURSE ONLY: CPT

## 2023-03-24 ENCOUNTER — ALLIED HEALTH/NURSE VISIT (OUTPATIENT)
Dept: FAMILY MEDICINE | Facility: CLINIC | Age: 53
End: 2023-03-24
Payer: COMMERCIAL

## 2023-03-24 DIAGNOSIS — A52.8 LATE LATENT SYPHILIS: Primary | ICD-10-CM

## 2023-03-24 DIAGNOSIS — A52.8 LATE LATENT SYPHILIS: ICD-10-CM

## 2023-03-24 PROCEDURE — 99207 PR NO CHARGE NURSE ONLY: CPT

## 2023-03-24 PROCEDURE — 96372 THER/PROPH/DIAG INJ SC/IM: CPT | Performed by: FAMILY MEDICINE

## 2023-03-24 NOTE — PROGRESS NOTES
Patient in clinic for penicillin G benzathine (BICILLIN L-A) injection 2.4 Million Units.     In clinic, we only have 1.2million units. Order pended for 2 doses of 1.2million units to satisfy original order of 2.4million units.     Please sign if agree

## 2023-03-24 NOTE — PROGRESS NOTES
Clinic Administered Medication Documentation      Injectable Medication Documentation    Is there an active order (written within the past 365 days, with administrations remaining, not ) in the chart? Yes.     Patient was given Penicillin G Benzathine (Bicillin). Prior to medication administration, verified patient's identity using patient s name and date of birth. Please see MAR and medication order for additional information. Patient instructed to remain in clinic for 15 minutes and report any adverse reaction to staff immediately.    Vial/Syringe: Syringe  Was this medication supplied by the patient? No  Is this a medication the patient will need to receive again? No - not necessary to check for refills remaining.

## 2023-07-07 ENCOUNTER — LAB (OUTPATIENT)
Dept: LAB | Facility: CLINIC | Age: 53
End: 2023-07-07
Attending: FAMILY MEDICINE
Payer: COMMERCIAL

## 2023-07-07 DIAGNOSIS — E78.49 OTHER HYPERLIPIDEMIA: ICD-10-CM

## 2023-07-07 DIAGNOSIS — E78.01 FAMILIAL HYPERCHOLESTEREMIA: ICD-10-CM

## 2023-07-07 LAB
CHOLEST SERPL-MCNC: 190 MG/DL
HDLC SERPL-MCNC: 41 MG/DL
LDLC SERPL CALC-MCNC: 118 MG/DL
NONHDLC SERPL-MCNC: 149 MG/DL
TRIGL SERPL-MCNC: 155 MG/DL

## 2023-07-07 PROCEDURE — 36415 COLL VENOUS BLD VENIPUNCTURE: CPT

## 2023-07-07 PROCEDURE — 80061 LIPID PANEL: CPT

## 2023-07-14 ENCOUNTER — ANCILLARY PROCEDURE (OUTPATIENT)
Dept: GENERAL RADIOLOGY | Facility: CLINIC | Age: 53
End: 2023-07-14
Attending: PEDIATRICS
Payer: COMMERCIAL

## 2023-07-14 ENCOUNTER — OFFICE VISIT (OUTPATIENT)
Dept: ORTHOPEDICS | Facility: CLINIC | Age: 53
End: 2023-07-14
Payer: COMMERCIAL

## 2023-07-14 VITALS — SYSTOLIC BLOOD PRESSURE: 132 MMHG | DIASTOLIC BLOOD PRESSURE: 82 MMHG | WEIGHT: 211 LBS | BODY MASS INDEX: 28.86 KG/M2

## 2023-07-14 DIAGNOSIS — M25.552 LEFT HIP PAIN: ICD-10-CM

## 2023-07-14 DIAGNOSIS — M17.11 PRIMARY OSTEOARTHRITIS OF RIGHT KNEE: ICD-10-CM

## 2023-07-14 DIAGNOSIS — M25.561 RIGHT KNEE PAIN, UNSPECIFIED CHRONICITY: Primary | ICD-10-CM

## 2023-07-14 PROCEDURE — 73562 X-RAY EXAM OF KNEE 3: CPT | Mod: TC | Performed by: RADIOLOGY

## 2023-07-14 PROCEDURE — 99204 OFFICE O/P NEW MOD 45 MIN: CPT | Performed by: PEDIATRICS

## 2023-07-14 ASSESSMENT — PAIN SCALES - GENERAL: PAINLEVEL: MODERATE PAIN (4)

## 2023-07-14 NOTE — PROGRESS NOTES
ASSESSMENT & PLAN    Quincy was seen today for pain.    Diagnoses and all orders for this visit:    Right knee pain, unspecified chronicity  -     XR Knee Standing AP [place] Bilat Lat Right; Future  -     MR Knee Right w/o Contrast; Future    Left hip pain      Focus on right knee today, acute on chronic.  Plan MRI right knee next.  Left hip x-rays and further treatment deferred, he prefers to continue with some home stretching.  Questions answered. Discussed signs and symptoms that may indicate more serious issues; the patient was instructed to seek appropriate care if noted. Will indicates understanding of these issues and agrees with the plan.      See Patient Instructions  Patient Instructions   With the right knee pain, more medial aspect, we discussed consideration for something joint related, including some early underlying degenerative change, possibly medial meniscus involvement.  Plan to obtain MRI of the right knee next, and go from there.    With the left hip area pain, does appear suspicious for hip joint source.  We deferred any imaging today.  Okay to proceed with continued monitoring, and working on some stretching or other exercises on her own.  Should you desire further intervention or evaluation, particularly if interested in x-rays, we can obtain those at a visit.    Advanced imaging is done by appointment. Please call [facility] ([facility]/[place]) [phone]  or [facility] ([facility]/[place]) [phone]  to schedule your MRI.     Some insurance companies may require a prior authorization to be completed which can delay the time until you are able to schedule your appointment.       If you are active on [employer], you may have access to your test results before your provider is able to review the study and advise on next steps.      The clinic will contact you with results. If you have not heard from the clinic within 2-3 days following  "your MRI, please contact us at the number listed below.      If you have any further questions for your physician or physician s care team you can contact them thru [employer] or by calling [phone].        [facility] C[facility] [place]    -----  Chief Complaint   Patient presents with     Right Knee - Pain       SUBJECTIVE  Quincy Harrell is a/an 53 year old male who is seen as a self referral for evaluation of right knee. Also left hip denies catching, hurts with turning while sleeping or rotating leg. Pain deep inside, no HX, onset 1 month    The patient is seen by themselves.  The patient is Right handed    Onset: 2  month(s) ago. Patient describes injury occured while strength training doing an iso squat felt a \"tweak\"  Location of Pain: right medial  Worsened by: twisting and lateral force, kneeling, getting up and down from ground/kneeling,  driving, not moving   Better with: motion, Ibuprofen  Treatments tried: ibuprofen  Associated symptoms: swelling and painful    Orthopedic/Surgical history: YES - Date: 2020. 2022 right knee  Social History/Occupation: performing arts (band)      **  Above information per rooming staff.  Additional history:  Symptoms past 1-2 months, on top of injury that was ~3 years ago.  Not a new exercise. Was in squat position, and noted pain onset with that. Points more over medial knee.  Has not returned to that specific exercise, though has still tried to remain somewhat active (including dance).  Pain rather generalized, more medial to posterior.  No noted joint noise. No sense of instability.  Pain more with full flexion, rotation/pivot, and lateral motions.      **  Has also noted recent onset (~1 month) left tanterior/deep hip pain. Notes more with rotation. No mechanical symptoms.        REVIEW OF SYSTEMS:  Review of Systems    OBJECTIVE:  /82   Wt 95.7 kg (211 lb)   BMI 28.86 kg/m           Right Knee " exam    Inspection:   no effusion   no ecchymosis    ROM:      Full active and passive ROM with flexion and extension  Some pain full flexion    Patellar Motion:      Normal patellar tracking noted through range of motion    Tender:      medial joint line    Non Tender:       remainder of knee area    Special Tests:      Medial pain with Shanta       neg (-) Lachman       neg (-) anterior drawer       neg (-) posterior drawer       neg (-) varus       neg (-) valgus       + medial pain with forced extension              Left hip exam    ROM:     Full active and passive ROM   Some discomfort end of ER > IR    Strength: no change hip flexion, abduction, adduction    Special Tests:      positive (+) FADIR       positive (+) scour       Log roll pos            RADIOLOGY:  Final results and radiologist's interpretation, available in the [facility] health record.  Images were reviewed with the patient in the office today.  My personal interpretation of the performed imaging: No acute bony abnormality noted.  Joint spaces appear fairly well-preserved.  No significant change when compared to previous x-rays from 2020.      Recent Results (from the past 24 hour(s))   XR Knee Standing AP [place] Bilat Lat Right    Narrative    KNEE STANDING AP [place] BILATERAL LATERAL RIGHT  DATE/TIME: 7/14/2023 10:15 AM     INDICATION: Right knee pain.   COMPARISON: 4/15/2020.      Impression    IMPRESSION:  1.  Right knee: Normal joint spacing and alignment. No fracture or  joint effusion. Small superior patellar spur.  2.  Left knee: Negative.    RAQUEL MCKINNEY MD         SYSTEM ID:  [id number]

## 2023-07-14 NOTE — LETTER
7/14/2023         RE: Quincy Harrell  [address] [place] [address]        Dear Colleague,    Thank you for referring your patient, Quincy Harrell, to the [facility] [place]. Please see a copy of my visit note below.    ASSESSMENT & PLAN    Quincy was seen today for pain.    Diagnoses and all orders for this visit:    Right knee pain, unspecified chronicity  -     XR Knee Standing AP [place] Bilat Lat Right; Future  -     MR Knee Right w/o Contrast; Future    Left hip pain      Focus on right knee today, acute on chronic.  Plan MRI right knee next.  Left hip x-rays and further treatment deferred, he prefers to continue with some home stretching.  Questions answered. Discussed signs and symptoms that may indicate more serious issues; the patient was instructed to seek appropriate care if noted. Will indicates understanding of these issues and agrees with the plan.      See Patient Instructions  Patient Instructions   With the right knee pain, more medial aspect, we discussed consideration for something joint related, including some early underlying degenerative change, possibly medial meniscus involvement.  Plan to obtain MRI of the right knee next, and go from there.    With the left hip area pain, does appear suspicious for hip joint source.  We deferred any imaging today.  Okay to proceed with continued monitoring, and working on some stretching or other exercises on her own.  Should you desire further intervention or evaluation, particularly if interested in x-rays, we can obtain those at a visit.    Advanced imaging is done by appointment. Please call [facility] ([facility]/[place]) [phone]  or [facility] ([facility]/[place]) [phone]  to schedule your MRI.     Some insurance companies may require a prior authorization to be completed which can delay the time until you are able to  "schedule your appointment.       If you are active on [employer], you may have access to your test results before your provider is able to review the study and advise on next steps.      The clinic will contact you with results. If you have not heard from the clinic within 2-3 days following your MRI, please contact us at the number listed below.      If you have any further questions for your physician or physician s care team you can contact them thru [employer] or by calling [phone].        Mode Camarena [facility]    -----  Chief Complaint   Patient presents with     Right Knee - Pain       SUBJECTIVE  Quincy Harrell is a/an 53 year old male who is seen as a self referral for evaluation of right knee. Also left hip denies catching, hurts with turning while sleeping or rotating leg. Pain deep inside, no HX, onset 1 month    The patient is seen by themselves.  The patient is Right handed    Onset: 2  month(s) ago. Patient describes injury occured while strength training doing an iso squat felt a \"tweak\"  Location of Pain: right medial  Worsened by: twisting and lateral force, kneeling, getting up and down from ground/kneeling,  driving, not moving   Better with: motion, Ibuprofen  Treatments tried: ibuprofen  Associated symptoms: swelling and painful    Orthopedic/Surgical history: YES - Date: 2020. 2022 right knee  Social History/Occupation: performing arts (band)      **  Above information per rooming staff.  Additional history:  Symptoms past 1-2 months, on top of injury that was ~3 years ago.  Not a new exercise. Was in squat position, and noted pain onset with that. Points more over medial knee.  Has not returned to that specific exercise, though has still tried to remain somewhat active (including dance).  Pain rather generalized, more medial to posterior.  No noted joint noise. No sense of instability.  Pain more with full flexion, rotation/pivot, " and lateral motions.      **  Has also noted recent onset (~1 month) left tanterior/deep hip pain. Notes more with rotation. No mechanical symptoms.        REVIEW OF SYSTEMS:  Review of Systems    OBJECTIVE:  /82   Wt 95.7 kg (211 lb)   BMI 28.86 kg/m           Right Knee exam    Inspection:   no effusion   no ecchymosis    ROM:      Full active and passive ROM with flexion and extension  Some pain full flexion    Patellar Motion:      Normal patellar tracking noted through range of motion    Tender:      medial joint line    Non Tender:       remainder of knee area    Special Tests:      Medial pain with Shanta       neg (-) Lachman       neg (-) anterior drawer       neg (-) posterior drawer       neg (-) varus       neg (-) valgus       + medial pain with forced extension              Left hip exam    ROM:     Full active and passive ROM   Some discomfort end of ER > IR    Strength: no change hip flexion, abduction, adduction    Special Tests:      positive (+) FADIR       positive (+) scour       Log roll pos            RADIOLOGY:  Final results and radiologist's interpretation, available in the [facility] health record.  Images were reviewed with the patient in the office today.  My personal interpretation of the performed imaging: No acute bony abnormality noted.  Joint spaces appear fairly well-preserved.  No significant change when compared to previous x-rays from 2020.      Recent Results (from the past 24 hour(s))   XR Knee Standing AP [place] Bilat Lat Right    Narrative    KNEE STANDING AP [place] BILATERAL LATERAL RIGHT  DATE/TIME: 7/14/2023 10:15 AM     INDICATION: Right knee pain.   COMPARISON: 4/15/2020.      Impression    IMPRESSION:  1.  Right knee: Normal joint spacing and alignment. No fracture or  joint effusion. Small superior patellar spur.  2.  Left knee: Negative.    RAQUEL MCKINNEY MD         SYSTEM ID:  [id number]                   Again, thank you for allowing me to participate in the  care of your patient.        Sincerely,        Mode Camarena, DO

## 2023-07-14 NOTE — PATIENT INSTRUCTIONS
With the right knee pain, more medial aspect, we discussed consideration for something joint related, including some early underlying degenerative change, possibly medial meniscus involvement.  Plan to obtain MRI of the right knee next, and go from there.    With the left hip area pain, does appear suspicious for hip joint source.  We deferred any imaging today.  Okay to proceed with continued monitoring, and working on some stretching or other exercises on her own.  Should you desire further intervention or evaluation, particularly if interested in x-rays, we can obtain those at a visit.    Advanced imaging is done by appointment. Please call [facility] ([facility]/[place]) [phone]  or [facility] ([facility]/[place]) [phone]  to schedule your MRI.     Some insurance companies may require a prior authorization to be completed which can delay the time until you are able to schedule your appointment.       If you are active on [employer], you may have access to your test results before your provider is able to review the study and advise on next steps.      The clinic will contact you with results. If you have not heard from the clinic within 2-3 days following your MRI, please contact us at the number listed below.      If you have any further questions for your physician or physician s care team you can contact them thru [employer] or by calling [phone].

## 2023-07-15 DIAGNOSIS — R53.83 FATIGUE, UNSPECIFIED TYPE: ICD-10-CM

## 2023-07-15 DIAGNOSIS — E78.49 OTHER HYPERLIPIDEMIA: ICD-10-CM

## 2023-07-15 RX ORDER — ROSUVASTATIN CALCIUM 40 MG/1
40 TABLET, COATED ORAL DAILY
Qty: 90 TABLET | Refills: 1 | Status: SHIPPED | OUTPATIENT
Start: 2023-07-15 | End: 2024-03-19

## 2023-07-21 ENCOUNTER — TELEPHONE (OUTPATIENT)
Dept: ORTHOPEDICS | Facility: CLINIC | Age: 53
End: 2023-07-21

## 2023-07-21 NOTE — TELEPHONE ENCOUNTER
Kel was advised that MRI was not approved from his insurance company, ([employer] due to not completing at least 6-weeks of conservative therapy.  Will would like home exercises for his knee given to him [facility] [facility]. A [employer] Message will be sent to the patient with exercises.  Will wondered about biking, will was advised that if it does not exacerbate his sx, biking would be fine.  Will reports that his R knee is the same, noting that it hurts worse when he is inactive and with lateral forces or twisting.  Dorian Yañez, LAT, ATC

## 2023-12-07 ENCOUNTER — HOSPITAL ENCOUNTER (OUTPATIENT)
Dept: MRI IMAGING | Facility: HOSPITAL | Age: 53
Discharge: HOME | End: 2023-12-07
Attending: PEDIATRICS | Admitting: PEDIATRICS
Payer: COMMERCIAL

## 2023-12-07 DIAGNOSIS — M25.561 RIGHT KNEE PAIN, UNSPECIFIED CHRONICITY: ICD-10-CM

## 2023-12-07 PROCEDURE — 73721 MRI JNT OF LWR EXTRE W/O DYE: CPT | Mod: RT

## 2023-12-08 ENCOUNTER — TELEPHONE (OUTPATIENT)
Dept: ORTHOPEDICS | Facility: CLINIC | Age: 53
End: 2023-12-08

## 2023-12-08 DIAGNOSIS — S83.231D COMPLEX TEAR OF MEDIAL MENISCUS OF RIGHT KNEE AS CURRENT INJURY, SUBSEQUENT ENCOUNTER: Primary | ICD-10-CM

## 2023-12-08 NOTE — TELEPHONE ENCOUNTER
"Please notify of MRI results. May be able to send via [employer] message attached to this encounter.  MRI shows medial meniscus tear. Bone marrow edema may be inflammation from adjacent cartilage issues.  Meniscus tear will not heal on its own.  Primary options: 1) live with (though has been doing that), 2) formal PT, 3) trial steroid injection, 4) referral to ortho surgery.  If looking for a \"fix\" can refer to ortho surgery. Otherwise, would primarily offer trial of steroid injection (can be done at a clinic visit) and referral to PT.  I would be happy to have a visit with the patient (in person, by video, or by phone) to discuss further if that would be helpful.  Thanks.  Mode Camarena DO, CAQ        MR Knee Right w/o Contrast    Narrative    EXAM: MR KNEE RIGHT W/O CONTRAST  LOCATION: [facility]  DATE: 12/7/2023    INDICATION: Right knee pain.  COMPARISON: Radiograph 04/15/2020  TECHNIQUE: Unenhanced.    FINDINGS:    MEDIAL COMPARTMENT:   -Meniscus: Complex tear of the free edge of the junction posterior horn and body of the medial meniscus (series 6 image 24 and series 7 image 16).  -Cartilage: No full-thickness cartilage defect.    LATERAL COMPARTMENT:  -Meniscus: Normal.   -Cartilage: No full-thickness cartilage defect or subchondral marrow changes.    PATELLOFEMORAL COMPARTMENT:   -Alignment: Patella midline. No subluxation or tilting.   -Cartilage: Grade 2 chondromalacia of the lateral patellar facet (series 4 image 15). Trochlear articular cartilage is intact.    CRUCIATE LIGAMENTS:   -ACL: Intact.  -PCL: Intact.    COLLATERAL LIGAMENTS:   -Medial collateral ligament: Superficial and deep fibers are normal.  -Lateral collateral ligament: Normal.    POSTEROMEDIAL CORNER:  -Distal semimembranosus tendon is normal.   -Pes anserine tendons are normal. Posteromedial corner complex ligaments are intact.    POSTEROLATERAL CORNER:   -Popliteal tendon is intact. No tendinopathy.  -Biceps " femoris tendon and posterolateral corner complex ligaments are intact.    EXTENSOR MECHANISM:   -Quadriceps tendon: Normal.  -Patellar tendon: Normal.  -Patellofemoral ligaments and retinacula: Intact.    JOINT:   -No joint effusion or synovitis.    BONES:  -No acute fracture. Nonspecific patchy edema-like marrow signal of the medial femoral condyle (series 8 image 16). No confluent marrow placing process.    SOFT TISSUES:   -No popliteal cyst, acute muscular injury, or soft tissue mass. Neurovascular structures are normal.       Impression    IMPRESSION:    1.  Complex tear of the junction posterior horn and body of the medial meniscus.  2.  Nonspecific edema-like marrow signal of the medial femoral condyle which may reflect a contusion.  3.  Intact lateral meniscus, and major ligaments and tendons.

## 2023-12-11 NOTE — PROGRESS NOTES
CHIEF COMPLAINT:   Chief Complaint   Patient presents with    Right Knee - Pain     Onset: 3 years. Patient notes it is workout/sports related. He hyperextended it 3 years ago, got better, then 2 years ago over extended it, got better, then 9 months ago he tweaked it lifting. Pain is posterior/medial/anterior. Pain is worse with sitting for periods of time or sleeping all night and getting up in the morning. He has had physical therapy, but only feels good when he is doing it.        Quincy Harrell is seen today in the [facility] for evaluation of right knee pain at the request of Dr. Mode Camarena         HISTORY OF PRESENT ILLNESS    Quincy Harrell is a 53 year old male seen for evaluation of ongoing right knee.  Pain has been present for 3 years.   relates to working out and sports over the years. He did hyperextend 3 yers ago doing hamstrings machine, but got better. Then 2 years ago over-extended it again while stretching, got better. Then tweaked it 9 months ago lifting weights.    He locates pain along the inner aspect of the knee, worse with prolonged sitting, sleeping, then getting up to walk.    Treatment with home exercise program, which helps, but only when he goes to therapy. Does not take over the counter pain medications, topicals.    Denies low back pain. Denies numbness and tingling.    Present symptoms: pain medially  and anteriorly, pain dull/achy , moderate pain, mild swelling. Denies locking, catching.    Pain severity: 7/10  Frequency of symptoms: frequently  Exacerbating Factors: weight bearing, stairs, [id number]  Relieving Factors: rest, movements  Night Pain:  sometimes  Pain while at rest: Yes   Numbness or tingling: No   Patient has tried:     NSAIDS: No      Physical Therapy: No      Activity modification: Yes      Bracing:  occasional use of a wrap       Injections: No      Ice: No      Assistive device:  No      Other  PMH:  has a past medical history of Bacterial conjunctivitis of both eyes (10/17/2022), Chronic left shoulder pain (6/11/2016), and Palpitations (2000).    He has no past medical history of Arthritis, Cancer (H), Cerebral infarction (H), Congestive heart failure (H), COPD (chronic obstructive pulmonary disease) (H), Depressive disorder, Diabetes (H), Heart disease, History of blood transfusion, Hypertension, Thyroid disease, or Uncomplicated asthma.  Patient Active Problem List   Diagnosis    Familial hypercholesteremia    Other hyperlipidemia    Family history of diabetes mellitus    BMI 29.0-29.9,adult    HSV-2 infection    Impaired fasting glucose    Chronic pain of right knee    Encounter for routine adult health examination without abnormal findings    Late latent syphilis       Surgical Hx:  has a past surgical history that includes REMOVAL OF TONSILS,13+ Y/O; Abdomen surgery (3/2/18); and orthopedic surgery (5/13/18).    Medications:   Current Outpatient Medications:     acyclovir (ZOVIRAX) 800 MG tablet, Take 1 tablet (800 mg) by mouth 5 times daily As needed, Disp: 90 tablet, Rfl: 1    rosuvastatin (CRESTOR) 40 MG tablet, Take 1 tablet (40 mg) by mouth daily, Disp: 90 tablet, Rfl: 1    Allergies: No Known Allergies    Social Hx: business.   reports that he has never smoked. He has never been exposed to tobacco smoke. He has never used smokeless tobacco. He reports current alcohol use. He reports that he does not use drugs.    Family Hx: family history includes Alzheimer Disease in his maternal grandfather; Back Pain in his son; Breast Cancer in his maternal grandmother and mother; Cancer in his maternal grandmother, paternal grandfather, and paternal grandmother; Circulatory in his father; Diabetes in his sister; Eye Disorder in his mother; Heart Disease in his mother; Hyperlipidemia in his father; Hypertension in his mother; Multiple Sclerosis in his sister; Neurologic Disorder in his mother; Obesity in  "his mother and sister; Other Cancer in his paternal grandfather and paternal grandmother; Substance Abuse in his mother; Thyroid Disease in his sister.    REVIEW OF SYSTEMS: 10 point ROS neg other than the symptoms noted above in the HPI and PMH. Notables include  CONSTITUTIONAL:NEGATIVE for fever, chills, change in weight  INTEGUMENTARY/SKIN: NEGATIVE for worrisome rashes, moles or lesions  MUSCULOSKELETAL:See HPI above  NEURO: NEGATIVE for weakness, dizziness or paresthesias    PHYSICAL EXAM:  /74   Pulse 50   Ht 1.821 m (5' 11.7\")   Wt 96.3 kg (212 lb 3.2 oz)   BMI 29.02 kg/m     GENERAL APPEARANCE: healthy, alert, no distress  SKIN: no suspicious lesions or rashes  NEURO: Normal strength and tone, mentation intact and speech normal  PSYCH:  mentation appears normal and affect normal, not anxious  RESPIRATORY: No increased work of breathing.  HANDS: no clubbing, nail pitting  LYMPH: no palpable popliteal lymphadenopathy.    BILATERAL LOWER EXTREMITIES:  Gait: slight favors the right.  Alignment: varus  No gross deformities or masses.  No Quad atrophy, strength normal.  Intact sensation deep peroneal nerve, superficial peroneal nerve, med/lat tibial nerve, sural nerve, saphenous nerve  Intact EHL, EDL, TA, FHL, GS, quadriceps hamstrings and hip flexors  Toes warm and well perfused, brisk capillary refill. Palpable 2+ dp pulses.  Bilateral calf soft and nttp or squeeze.  DTRs: achilles 2+, patella 2+.  Edema: trace    LEFT KNEE EXAM:    Skin: intact, no ecchymosis or erythema  Squat: 100 %, not limited by pain.     ROM: full extension to 125+ flexion  Tight hamstrings on straight leg raise.  Effusion: none  Tender: NTTP med/lat joint line, anterior or posterior knee  McMurrays: negative    MCL: stable, and non-painful at both 0 and 30 degrees knee flexion  Varus stress: stable, and non-painful at both 0 and 30 degrees knee flexion  Lachmans: neg, firm endpoint  Posterior Drawer stable  Patellofemoral " joint:                Apprehension: negative              Crepitations: mild    RIGHT KNEE EXAM:    Skin: intact, no ecchymosis or erythema  Squat: 100 %, not limited by pain.     ROM: full extension to 130 flexion with posteromedial discomfort in both flexion and extension  Tight hamstrings on straight leg raise.  Effusion: none  Tender: medial joint line, posteromedial joint line  Nontender to palpation lat joint line, anterior or posterior knee  McMurrays: positive medially for pain  Positive Aplys compression test medially.    MCL: stable, and non-painful at both 0 and 30 degrees knee flexion  Varus stress: stable, and non-painful at both 0 and 30 degrees knee flexion  Lachmans: neg, firm endpoint  Posterior Drawer stable  Patellofemoral joint:                Apprehension: negative              Crepitations: mild   Grind: mild positive.    X-RAY:  bilateral galindo and sunrise views, lateral views right knee from 7/14/2023 were reviewed in clinic today. On my review, no obvious fractures or dislocations.   1.  Right knee: Normal joint spacing and alignment. No fracture or joint effusion. Small superior patellar spur.  2.  Left knee: Negative.    MRI:  MRI right knee from 12/7/2023 was reviewed in clinic today.    1.  Complex tear of the junction posterior horn and body of the medial meniscus.  2.  Nonspecific edema-like marrow signal of the medial femoral condyle which may reflect a contusion.  3.  Intact lateral meniscus, and major ligaments and tendons.       ASSESSMENT/PLAN: Quincy Harrell is a 53 year old male with chronic right knee pain, complex medial meniscus tear.     * discussed exam and imaging findings with them, what appears to be a complex medial meniscal tear on MRI, as well as some underlying arthritic changes, which is consistent with symptoms and physical examination findings.     * Discussed treatment options including nonoperative treatment with continued rest, ice, elevation,  activity modification, NSAIDS and Physical Therapy, bracing and potential injections versus surgical treatment with arthroscopy and meniscal repair versus debridement, possible chondral debridement. Risks and benefits of each discussed in detail.  * in the setting of underlying chondrosis, predictability of arthroscopy is uncertain, unless mechanical symptoms present due to the meniscus tear.    * surgical risks discussed: bleeding, infection, pain, scar, damage to adjacent structures (nerve, vessels, cartilage), stiffness, post-traumatic arthritis, failure to relieve symptoms, recurrence of symptoms, blood clots (DVT), pulmonary emolism, risks of anesthesia and death. This surgery is not intended nor expected to alleviate arthritic pain symptoms, nor will it treat or correct underlying arthritic changes. Arthritis and symptoms related to arthritis could worsen with arthroscopy and meniscal and/or chondral debridement. Patient understands.    * understanding the risks of surgery, as a quality of life decision, they elected to think about the options. No surgical orders placed.    Should he decide on surgery, we would plan  * plan: RIGHT knee arthroscopy with partial meniscal debridement, possible chondral debridement, outpatient surgery  * will need H+P from PCP prior to surgery  * they will be on ASA x2 weeks postoperative for DVT prophylaxis, as well as use of  crutches  * plan Physical Therapy to start 2 weeks postoperative, to be determined at postoperative visit.  * return to clinic 2 week postop for wound check, sooner as needed.  * all questions addressed and answered prior to discharge from clinic today.  * they will call if any questions or concerns in the meantime.    * All questions were addressed and answered prior to discharge from clinic today. The patient acknowledges an understanding of and agreement with the plan set forth during today's visit. Patient was advised to call our office or MyChart us if  any further questions arise upon leaving our office today.         Eddie Cid M.D., M.S.  Dept. of Orthopaedic Surgery  [facility]

## 2023-12-13 ENCOUNTER — OFFICE VISIT (OUTPATIENT)
Dept: ORTHOPEDICS | Facility: CLINIC | Age: 53
End: 2023-12-13
Attending: PEDIATRICS
Payer: COMMERCIAL

## 2023-12-13 VITALS
HEART RATE: 50 BPM | WEIGHT: 212.2 LBS | DIASTOLIC BLOOD PRESSURE: 74 MMHG | BODY MASS INDEX: 28.74 KG/M2 | HEIGHT: 72 IN | SYSTOLIC BLOOD PRESSURE: 117 MMHG

## 2023-12-13 DIAGNOSIS — M25.561 CHRONIC PAIN OF RIGHT KNEE: ICD-10-CM

## 2023-12-13 DIAGNOSIS — S83.231D COMPLEX TEAR OF MEDIAL MENISCUS OF RIGHT KNEE AS CURRENT INJURY, SUBSEQUENT ENCOUNTER: ICD-10-CM

## 2023-12-13 DIAGNOSIS — G89.29 CHRONIC PAIN OF RIGHT KNEE: ICD-10-CM

## 2023-12-13 PROCEDURE — 99243 OFF/OP CNSLTJ NEW/EST LOW 30: CPT | Performed by: ORTHOPAEDIC SURGERY

## 2023-12-13 ASSESSMENT — PAIN SCALES - GENERAL: PAINLEVEL: SEVERE PAIN (7)

## 2023-12-13 NOTE — LETTER
12/13/2023         RE: Quincy Harrell  [address] [place] [address]        Dear Colleague,    Thank you for referring your patient, Quincy Harrell, to the [facility] [place]. Please see a copy of my visit note below.    CHIEF COMPLAINT:   Chief Complaint   Patient presents with     Right Knee - Pain     Onset: 3 years. Patient notes it is workout/sports related. He hyperextended it 3 years ago, got better, then 2 years ago over extended it, got better, then 9 months ago he tweaked it lifting. Pain is posterior/medial/anterior. Pain is worse with sitting for periods of time or sleeping all night and getting up in the morning. He has had physical therapy, but only feels good when he is doing it.        Quincy Harrell is seen today in the [facility] for evaluation of right knee pain at the request of Dr. Mode Camarena         HISTORY OF PRESENT ILLNESS    Quincy Harrell is a 53 year old male seen for evaluation of ongoing right knee.  Pain has been present for 3 years.   relates to working out and sports over the years. He did hyperextend 3 yers ago doing hamstrings machine, but got better. Then 2 years ago over-extended it again while stretching, got better. Then tweaked it 9 months ago lifting weights.    He locates pain along the inner aspect of the knee, worse with prolonged sitting, sleeping, then getting up to walk.    Treatment with home exercise program, which helps, but only when he goes to therapy. Does not take over the counter pain medications, topicals.    Denies low back pain. Denies numbness and tingling.    Present symptoms: pain medially  and anteriorly, pain dull/achy , moderate pain, mild swelling. Denies locking, catching.    Pain severity: 7/10  Frequency of symptoms: frequently  Exacerbating Factors: weight bearing, stairs, [id number]  Relieving Factors: rest,  movements  Night Pain:  sometimes  Pain while at rest: Yes   Numbness or tingling: No   Patient has tried:     NSAIDS: No      Physical Therapy: No      Activity modification: Yes      Bracing:  occasional use of a wrap       Injections: No      Ice: No      Assistive device:  No      Other PMH:  has a past medical history of Bacterial conjunctivitis of both eyes (10/17/2022), Chronic left shoulder pain (6/11/2016), and Palpitations (2000).    He has no past medical history of Arthritis, Cancer (H), Cerebral infarction (H), Congestive heart failure (H), COPD (chronic obstructive pulmonary disease) (H), Depressive disorder, Diabetes (H), Heart disease, History of blood transfusion, Hypertension, Thyroid disease, or Uncomplicated asthma.  Patient Active Problem List   Diagnosis     Familial hypercholesteremia     Other hyperlipidemia     Family history of diabetes mellitus     BMI 29.0-29.9,adult     HSV-2 infection     Impaired fasting glucose     Chronic pain of right knee     Encounter for routine adult health examination without abnormal findings     Late latent syphilis       Surgical Hx:  has a past surgical history that includes REMOVAL OF TONSILS,13+ Y/O; Abdomen surgery (3/2/18); and orthopedic surgery (5/13/18).    Medications:   Current Outpatient Medications:      acyclovir (ZOVIRAX) 800 MG tablet, Take 1 tablet (800 mg) by mouth 5 times daily As needed, Disp: 90 tablet, Rfl: 1     rosuvastatin (CRESTOR) 40 MG tablet, Take 1 tablet (40 mg) by mouth daily, Disp: 90 tablet, Rfl: 1    Allergies: No Known Allergies    Social Hx: business.   reports that he has never smoked. He has never been exposed to tobacco smoke. He has never used smokeless tobacco. He reports current alcohol use. He reports that he does not use drugs.    Family Hx: family history includes Alzheimer Disease in his maternal grandfather; Back Pain in his son; Breast Cancer in his maternal grandmother and mother; Cancer in his maternal  "grandmother, paternal grandfather, and paternal grandmother; Circulatory in his father; Diabetes in his sister; Eye Disorder in his mother; Heart Disease in his mother; Hyperlipidemia in his father; Hypertension in his mother; Multiple Sclerosis in his sister; Neurologic Disorder in his mother; Obesity in his mother and sister; Other Cancer in his paternal grandfather and paternal grandmother; Substance Abuse in his mother; Thyroid Disease in his sister.    REVIEW OF SYSTEMS: 10 point ROS neg other than the symptoms noted above in the HPI and PMH. Notables include  CONSTITUTIONAL:NEGATIVE for fever, chills, change in weight  INTEGUMENTARY/SKIN: NEGATIVE for worrisome rashes, moles or lesions  MUSCULOSKELETAL:See HPI above  NEURO: NEGATIVE for weakness, dizziness or paresthesias    PHYSICAL EXAM:  /74   Pulse 50   Ht 1.821 m (5' 11.7\")   Wt 96.3 kg (212 lb 3.2 oz)   BMI 29.02 kg/m     GENERAL APPEARANCE: healthy, alert, no distress  SKIN: no suspicious lesions or rashes  NEURO: Normal strength and tone, mentation intact and speech normal  PSYCH:  mentation appears normal and affect normal, not anxious  RESPIRATORY: No increased work of breathing.  HANDS: no clubbing, nail pitting  LYMPH: no palpable popliteal lymphadenopathy.    BILATERAL LOWER EXTREMITIES:  Gait: slight favors the right.  Alignment: varus  No gross deformities or masses.  No Quad atrophy, strength normal.  Intact sensation deep peroneal nerve, superficial peroneal nerve, med/lat tibial nerve, sural nerve, saphenous nerve  Intact EHL, EDL, TA, FHL, GS, quadriceps hamstrings and hip flexors  Toes warm and well perfused, brisk capillary refill. Palpable 2+ dp pulses.  Bilateral calf soft and nttp or squeeze.  DTRs: achilles 2+, patella 2+.  Edema: trace    LEFT KNEE EXAM:    Skin: intact, no ecchymosis or erythema  Squat: 100 %, not limited by pain.     ROM: full extension to 125+ flexion  Tight hamstrings on straight leg raise.  Effusion: " none  Tender: NTTP med/lat joint line, anterior or posterior knee  McMurrays: negative    MCL: stable, and non-painful at both 0 and 30 degrees knee flexion  Varus stress: stable, and non-painful at both 0 and 30 degrees knee flexion  Lachmans: neg, firm endpoint  Posterior Drawer stable  Patellofemoral joint:                Apprehension: negative              Crepitations: mild    RIGHT KNEE EXAM:    Skin: intact, no ecchymosis or erythema  Squat: 100 %, not limited by pain.     ROM: full extension to 130 flexion with posteromedial discomfort in both flexion and extension  Tight hamstrings on straight leg raise.  Effusion: none  Tender: medial joint line, posteromedial joint line  Nontender to palpation lat joint line, anterior or posterior knee  McMurrays: positive medially for pain  Positive Aplys compression test medially.    MCL: stable, and non-painful at both 0 and 30 degrees knee flexion  Varus stress: stable, and non-painful at both 0 and 30 degrees knee flexion  Lachmans: neg, firm endpoint  Posterior Drawer stable  Patellofemoral joint:                Apprehension: negative              Crepitations: mild   Grind: mild positive.    X-RAY:  bilateral galindo and sunrise views, lateral views right knee from 7/14/2023 were reviewed in clinic today. On my review, no obvious fractures or dislocations.   1.  Right knee: Normal joint spacing and alignment. No fracture or joint effusion. Small superior patellar spur.  2.  Left knee: Negative.    MRI:  MRI right knee from 12/7/2023 was reviewed in clinic today.    1.  Complex tear of the junction posterior horn and body of the medial meniscus.  2.  Nonspecific edema-like marrow signal of the medial femoral condyle which may reflect a contusion.  3.  Intact lateral meniscus, and major ligaments and tendons.       ASSESSMENT/PLAN: Quincy Harrell is a 53 year old male with chronic right knee pain, complex medial meniscus tear.     * discussed exam and  imaging findings with them, what appears to be a complex medial meniscal tear on MRI, as well as some underlying arthritic changes, which is consistent with symptoms and physical examination findings.     * Discussed treatment options including nonoperative treatment with continued rest, ice, elevation, activity modification, NSAIDS and Physical Therapy, bracing and potential injections versus surgical treatment with arthroscopy and meniscal repair versus debridement, possible chondral debridement. Risks and benefits of each discussed in detail.  * in the setting of underlying chondrosis, predictability of arthroscopy is uncertain, unless mechanical symptoms present due to the meniscus tear.    * surgical risks discussed: bleeding, infection, pain, scar, damage to adjacent structures (nerve, vessels, cartilage), stiffness, post-traumatic arthritis, failure to relieve symptoms, recurrence of symptoms, blood clots (DVT), pulmonary emolism, risks of anesthesia and death. This surgery is not intended nor expected to alleviate arthritic pain symptoms, nor will it treat or correct underlying arthritic changes. Arthritis and symptoms related to arthritis could worsen with arthroscopy and meniscal and/or chondral debridement. Patient understands.    * understanding the risks of surgery, as a quality of life decision, they elected to think about the options. No surgical orders placed.    Should he decide on surgery, we would plan  * plan: RIGHT knee arthroscopy with partial meniscal debridement, possible chondral debridement, outpatient surgery  * will need H+P from PCP prior to surgery  * they will be on ASA x2 weeks postoperative for DVT prophylaxis, as well as use of  crutches  * plan Physical Therapy to start 2 weeks postoperative, to be determined at postoperative visit.  * return to clinic 2 week postop for wound check, sooner as needed.  * all questions addressed and answered prior to discharge from clinic today.  *  they will call if any questions or concerns in the meantime.    * All questions were addressed and answered prior to discharge from clinic today. The patient acknowledges an understanding of and agreement with the plan set forth during today's visit. Patient was advised to call our office or MyChart us if any further questions arise upon leaving our office today.         Eddie Cid M.D., M.S.  Dept. of Orthopaedic Surgery  [facility]           Again, thank you for allowing me to participate in the care of your patient.        Sincerely,        Eddie Cid MD

## 2023-12-14 ENCOUNTER — TELEPHONE (OUTPATIENT)
Dept: SURGERY | Facility: CLINIC | Age: 53
End: 2023-12-14
Payer: COMMERCIAL

## 2023-12-14 DIAGNOSIS — S83.231A COMPLEX TEAR OF MEDIAL MENISCUS OF RIGHT KNEE AS CURRENT INJURY, INITIAL ENCOUNTER: Primary | ICD-10-CM

## 2023-12-14 RX ORDER — CEFAZOLIN SODIUM 2 G/100ML
2 INJECTION, SOLUTION INTRAVENOUS SEE ADMIN INSTRUCTIONS
Status: CANCELLED | OUTPATIENT
Start: 2023-12-14

## 2023-12-14 RX ORDER — CEFAZOLIN SODIUM 2 G/100ML
2 INJECTION, SOLUTION INTRAVENOUS
Status: CANCELLED | OUTPATIENT
Start: 2023-12-14

## 2023-12-14 NOTE — TELEPHONE ENCOUNTER
Patient is scheduled for surgery he has additional questions regarding aftercare. Patient is noting a trip to go scuba diving in March. Please call to advise Thank you     [phone]

## 2023-12-14 NOTE — TELEPHONE ENCOUNTER
Type of surgery: ARTHROSCOPY, KNEE  meniscal and chondral debridement   Location of surgery: [facility]  Date and time of surgery: 02/15/2024  Surgeon: ELIZABETH   Pre-Op Appt Date: pt will schedule   Post-Op Appt Date: 02/28/2024   Packet sent out: Yes  Pre-cert/Authorization completed:  No  Date:

## 2023-12-14 NOTE — TELEPHONE ENCOUNTER
Hello,    I'm with ortho con.  Pt of Dr. Cid has decided he would like to go forward with R knee surgery.  Please put in the order.  Pt is looking to talk to the  to get an appointment for surgery.

## 2023-12-14 NOTE — TELEPHONE ENCOUNTER
"I spoke with Will and let him know the current timeline of knee arthroscopy on 2/15/24 and going scuba diving on 3/9/24 is not appropriate. We would need to reschedule the surgery for after his trip. Or he could have surgery in January. He said that 2/15 was the earliest available. I let him know I would ask surgery scheduling to reach out to him for re-scheduling after his trip or in January if there is anything now available.   Otherwise, he may see another ortho surgeon in network that can do the operation. I supported this idea, but did try to alert him to any potential \"hang-ups\" with that course of treatment.    Polo Vicente PA-C, CAQ-OS  Dept. of Orthopedic Surgery  [facility]    "

## 2024-01-22 ENCOUNTER — PATIENT OUTREACH (OUTPATIENT)
Dept: CARE COORDINATION | Facility: CLINIC | Age: 54
End: 2024-01-22
Payer: COMMERCIAL

## 2024-01-24 ENCOUNTER — OFFICE VISIT (OUTPATIENT)
Dept: INTERNAL MEDICINE | Facility: CLINIC | Age: 54
End: 2024-01-24
Payer: COMMERCIAL

## 2024-01-24 VITALS
RESPIRATION RATE: 16 BRPM | OXYGEN SATURATION: 97 % | DIASTOLIC BLOOD PRESSURE: 72 MMHG | BODY MASS INDEX: 29.22 KG/M2 | HEART RATE: 60 BPM | WEIGHT: 215.7 LBS | HEIGHT: 72 IN | TEMPERATURE: 97.7 F | SYSTOLIC BLOOD PRESSURE: 118 MMHG

## 2024-01-24 DIAGNOSIS — Z11.59 ENCOUNTER FOR HCV SCREENING TEST FOR LOW RISK PATIENT: ICD-10-CM

## 2024-01-24 DIAGNOSIS — E78.5 HYPERLIPIDEMIA LDL GOAL <100: ICD-10-CM

## 2024-01-24 DIAGNOSIS — Z00.00 ENCOUNTER FOR GENERAL HEALTH EXAMINATION: ICD-10-CM

## 2024-01-24 DIAGNOSIS — Z12.5 SCREENING FOR PROSTATE CANCER: ICD-10-CM

## 2024-01-24 DIAGNOSIS — Z23 ENCOUNTER FOR IMMUNIZATION: ICD-10-CM

## 2024-01-24 DIAGNOSIS — Z13.220 SCREENING FOR HYPERLIPIDEMIA: ICD-10-CM

## 2024-01-24 DIAGNOSIS — Z20.2 STD EXPOSURE: ICD-10-CM

## 2024-01-24 DIAGNOSIS — S83.231A COMPLEX TEAR OF MEDIAL MENISCUS OF RIGHT KNEE AS CURRENT INJURY, INITIAL ENCOUNTER: Primary | ICD-10-CM

## 2024-01-24 PROBLEM — M25.561 CHRONIC PAIN OF RIGHT KNEE: Status: RESOLVED | Noted: 2020-04-15 | Resolved: 2024-01-24

## 2024-01-24 PROBLEM — J30.2 SEASONAL ALLERGIC RHINITIS: Status: ACTIVE | Noted: 2024-01-24

## 2024-01-24 PROBLEM — S83.249A TEAR OF MEDIAL MENISCUS OF KNEE: Status: ACTIVE | Noted: 2023-12-14

## 2024-01-24 PROBLEM — G89.29 CHRONIC PAIN OF RIGHT KNEE: Status: RESOLVED | Noted: 2020-04-15 | Resolved: 2024-01-24

## 2024-01-24 LAB
ALBUMIN SERPL BCG-MCNC: 4.6 G/DL (ref 3.5–5.2)
ALP SERPL-CCNC: 86 U/L (ref 40–150)
ALT SERPL W P-5'-P-CCNC: 42 U/L (ref 0–70)
ANION GAP SERPL CALCULATED.3IONS-SCNC: 12 MMOL/L (ref 7–15)
AST SERPL W P-5'-P-CCNC: 25 U/L (ref 0–45)
BILIRUB SERPL-MCNC: 0.2 MG/DL
BUN SERPL-MCNC: 18.3 MG/DL (ref 6–20)
CALCIUM SERPL-MCNC: 9.2 MG/DL (ref 8.6–10)
CHLORIDE SERPL-SCNC: 105 MMOL/L (ref 98–107)
CHOLEST SERPL-MCNC: 190 MG/DL
CREAT SERPL-MCNC: 0.88 MG/DL (ref 0.67–1.17)
DEPRECATED HCO3 PLAS-SCNC: 24 MMOL/L (ref 22–29)
EGFRCR SERPLBLD CKD-EPI 2021: >90 ML/MIN/1.73M2
ERYTHROCYTE [DISTWIDTH] IN BLOOD BY AUTOMATED COUNT: 12.3 % (ref 10–15)
FASTING STATUS PATIENT QL REPORTED: NO
GLUCOSE SERPL-MCNC: 99 MG/DL (ref 70–99)
HCT VFR BLD AUTO: 42.8 % (ref 40–53)
HCV AB SERPL QL IA: NONREACTIVE
HDLC SERPL-MCNC: 39 MG/DL
HGB BLD-MCNC: 14.6 G/DL (ref 13.3–17.7)
HIV 1+2 AB+HIV1 P24 AG SERPL QL IA: NONREACTIVE
LDLC SERPL CALC-MCNC: 112 MG/DL
MCH RBC QN AUTO: 31 PG (ref 26.5–33)
MCHC RBC AUTO-ENTMCNC: 34.1 G/DL (ref 31.5–36.5)
MCV RBC AUTO: 91 FL (ref 78–100)
NONHDLC SERPL-MCNC: 151 MG/DL
PLATELET # BLD AUTO: 224 10E3/UL (ref 150–450)
POTASSIUM SERPL-SCNC: 4 MMOL/L (ref 3.4–5.3)
PROT SERPL-MCNC: 7.5 G/DL (ref 6.4–8.3)
PSA SERPL DL<=0.01 NG/ML-MCNC: 0.41 NG/ML (ref 0–3.5)
RBC # BLD AUTO: 4.71 10E6/UL (ref 4.4–5.9)
SODIUM SERPL-SCNC: 141 MMOL/L (ref 135–145)
TRIGL SERPL-MCNC: 194 MG/DL
WBC # BLD AUTO: 5.3 10E3/UL (ref 4–11)

## 2024-01-24 PROCEDURE — 86803 HEPATITIS C AB TEST: CPT | Performed by: INTERNAL MEDICINE

## 2024-01-24 PROCEDURE — 36415 COLL VENOUS BLD VENIPUNCTURE: CPT | Performed by: INTERNAL MEDICINE

## 2024-01-24 PROCEDURE — 99000 SPECIMEN HANDLING OFFICE-LAB: CPT | Performed by: INTERNAL MEDICINE

## 2024-01-24 PROCEDURE — 86592 SYPHILIS TEST NON-TREP QUAL: CPT | Performed by: INTERNAL MEDICINE

## 2024-01-24 PROCEDURE — 87491 CHLMYD TRACH DNA AMP PROBE: CPT | Performed by: INTERNAL MEDICINE

## 2024-01-24 PROCEDURE — 90471 IMMUNIZATION ADMIN: CPT | Performed by: INTERNAL MEDICINE

## 2024-01-24 PROCEDURE — G0103 PSA SCREENING: HCPCS | Performed by: INTERNAL MEDICINE

## 2024-01-24 PROCEDURE — 87591 N.GONORRHOEAE DNA AMP PROB: CPT | Performed by: INTERNAL MEDICINE

## 2024-01-24 PROCEDURE — 86780 TREPONEMA PALLIDUM: CPT | Mod: 90 | Performed by: INTERNAL MEDICINE

## 2024-01-24 PROCEDURE — 90686 IIV4 VACC NO PRSV 0.5 ML IM: CPT | Performed by: INTERNAL MEDICINE

## 2024-01-24 PROCEDURE — 80053 COMPREHEN METABOLIC PANEL: CPT | Performed by: INTERNAL MEDICINE

## 2024-01-24 PROCEDURE — 85027 COMPLETE CBC AUTOMATED: CPT | Performed by: INTERNAL MEDICINE

## 2024-01-24 PROCEDURE — 87389 HIV-1 AG W/HIV-1&-2 AB AG IA: CPT | Performed by: INTERNAL MEDICINE

## 2024-01-24 PROCEDURE — 99213 OFFICE O/P EST LOW 20 MIN: CPT | Mod: 25 | Performed by: INTERNAL MEDICINE

## 2024-01-24 PROCEDURE — 80061 LIPID PANEL: CPT | Performed by: INTERNAL MEDICINE

## 2024-01-24 PROCEDURE — 99396 PREV VISIT EST AGE 40-64: CPT | Mod: 25 | Performed by: INTERNAL MEDICINE

## 2024-01-24 ASSESSMENT — ENCOUNTER SYMPTOMS
PARESTHESIAS: 0
WEAKNESS: 0
DIARRHEA: 0
PALPITATIONS: 0
HEMATURIA: 0
JOINT SWELLING: 0
FEVER: 0
FREQUENCY: 0
HEMATOCHEZIA: 0
COUGH: 0
MYALGIAS: 0
SORE THROAT: 0
ABDOMINAL PAIN: 0
CONSTIPATION: 0
NERVOUS/ANXIOUS: 0
ARTHRALGIAS: 0
CHILLS: 0
EYE PAIN: 0
DYSURIA: 0
HEARTBURN: 0
DIZZINESS: 0
NAUSEA: 0
SHORTNESS OF BREATH: 0
HEADACHES: 0

## 2024-01-24 ASSESSMENT — PAIN SCALES - GENERAL: PAINLEVEL: NO PAIN (0)

## 2024-01-24 NOTE — LETTER
February 4, 2024      Will Lawrence Harrell  [address] [place] [address]        Dear ,    We are writing to inform you of your test results.    Your tests are all OK. The treponemal antibody screening test, a test for syphilis,  was positive, as before, but the confirmation test, the confirmation test, the rapid plasma reagin test, was negative, indicating that you are negative for the syphilis test.     Resulted Orders   Chlamydia & Gonorrhea by PCR, [facility] - Clinic Collect   Result Value Ref Range    Chlamydia Trachomatis Negative Negative      Comment:      Negative for C. trachomatis rRNA by transcription mediated amplification.   A negative result by transcription mediated amplification does not preclude the presence of infection because results are dependent on proper and adequate collection, absence of inhibitors and sufficient rRNA to be detected.    Neisseria gonorrhoeae Negative Negative      Comment:      Negative for N. gonorrhoeae rRNA by transcription mediated amplification. A negative result by transcription mediated amplification does not preclude the presence of C. trachomatis infection because results are dependent on proper and adequate collection, absence of inhibitors and sufficient rRNA to be detected.   CBC with platelets   Result Value Ref Range    WBC Count 5.3 4.0 - 11.0 10e3/uL    RBC Count 4.71 4.40 - 5.90 10e6/uL    Hemoglobin 14.6 13.3 - 17.7 g/dL    Hematocrit 42.8 40.0 - 53.0 %    MCV 91 78 - 100 fL    MCH 31.0 26.5 - 33.0 pg    MCHC 34.1 31.5 - 36.5 g/dL    RDW 12.3 10.0 - 15.0 %    Platelet Count 224 150 - 450 10e3/uL   Comprehensive metabolic panel   Result Value Ref [facility]    Sodium 141 135 - 145 mmol/L      Comment:      Reference intervals for this test were updated on 09/26/2023 to more accurately reflect our healthy population. There may be differences in the flagging of prior results with similar values performed with this method. Interpretation  of those prior results can be made in the context of the updated reference intervals.     Potassium 4.0 3.4 - 5.3 mmol/L    Carbon Dioxide (CO2) 24 22 - 29 mmol/L    Anion Gap 12 7 - 15 mmol/L    Urea Nitrogen 18.3 6.0 - 20.0 mg/dL    Creatinine 0.88 0.67 - 1.17 mg/dL    GFR Estimate >90 >60 mL/min/1.73m2    Calcium 9.2 8.6 - 10.0 mg/dL    Chloride 105 98 - 107 mmol/L    Glucose 99 70 - 99 mg/dL    Alkaline Phosphatase 86 40 - 150 U/L      Comment:      Reference intervals for this test were updated on 11/14/2023 to more accurately reflect our healthy population. There may be differences in the flagging of prior results with similar values performed with this method. Interpretation of those prior results can be made in the context of the updated reference intervals.    AST 25 0 - 45 U/L      Comment:      Reference intervals for this test were updated on 6/12/2023 to more accurately reflect our healthy population. There may be differences in the flagging of prior results with similar values performed with this method. Interpretation of those prior results can be made in the context of the updated reference intervals.    ALT 42 0 - 70 U/L      Comment:      Reference intervals for this test were updated on 6/12/2023 to more accurately reflect our healthy population. There may be differences in the flagging of prior results with similar values performed with this method. Interpretation of those prior results can be made in the context of the updated reference intervals.      Protein Total 7.5 6.4 - 8.3 g/dL    Albumin 4.6 3.5 - 5.2 g/dL    Bilirubin Total 0.2 <=1.2 mg/dL   Lipid Profile (Chol, Trig, HDL, LDL calc)   Result Value Ref Range    Cholesterol 190 <200 mg/dL    Triglycerides 194 (H) <150 mg/dL    Direct Measure HDL 39 (L) >=40 mg/dL    LDL Cholesterol Calculated 112 (H) <=100 mg/dL    Non HDL Cholesterol 151 (H) <130 mg/dL    Patient Fasting > 8hrs? No     Narrative    Cholesterol  Desirable:  <200  mg/dL    Triglycerides  Normal:  Less than 150 mg/dL  Borderline High:  150-199 mg/dL  High:  200-499 mg/dL  Very High:  Greater than or equal to 500 mg/dL    Direct Measure HDL  Female:  Greater than or equal to 50 mg/dL   Male:  Greater than or equal to 40 mg/dL    LDL Cholesterol  Desirable:  <100mg/dL  Above Desirable:  100-129 mg/dL   Borderline High:  130-159 mg/dL   High:  160-189 mg/dL   Very High:  >= 190 mg/dL    Non HDL Cholesterol  Desirable:  130 mg/dL  Above Desirable:  130-159 mg/dL  Borderline High:  160-189 mg/dL  High:  190-219 mg/dL  Very High:  Greater than or equal to 220 mg/dL   PSA, screen   Result Value Ref Range    Prostate Specific Antigen Screen 0.41 0.00 - 3.50 ng/mL    Narrative    This result is obtained using the Roche Elecsys total PSA method on the keyur [id number] immunoassay analyzer. Results obtained with different assay methods or kits cannot be used interchangeably.   HIV Antigen Antibody Combo   Result Value Ref Range    HIV Antigen Antibody Combo Nonreactive Nonreactive      Comment:      Negative HIV-1/-2 antigen and antibody screening test results usually indicate the absence of HIV-1 and HIV-2 infection. However, such negative results do not rule-out acute HIV infection.  If acute HIV-1 or HIV-2 infection is suspected, detection of HIV-1 or HIV-2 RNA  is recommended.    Treponema Abs w Reflex to RPR and Titer   Result Value Ref Range    Treponema Antibody Total Reactive (A) Nonreactive      Comment:      The Anti-Treponema Antibody screening tends to remain positive for life, therefore it does not distinguish between active and past syphilis infections. All positive and equivocal results will automatically reflex to a non-specific Rapid Plasma Reagin (RPR) test.    If the Treponema Antibody is positive, and the RPR is positive, this is presumptive evidence of current infection, inadequately treated infection, persistent infection or reinfection.    If the Anti-Treponema Antibody  is positive and the RPR is negative, then a second Treponema specific test (TP-PA) will be performed to determine whether the antibody test is falsely positive or is detecting early infection.  If the latter is suspected, repeat testing in approximately two weeks is recommended.    Narrative    The Syphilis (Treponema) Antibody screening tends to remain positive for life, therefore it does not distinguish between active and past syphilis infections. All positive results will automatically reflex to a non-specific Rapid Plasma Reagin (RPR) test.     If the Syphilis (Treponema) Antibody is positive, and the RPR is positive, this is presumptive evidence of current infection, inadequately treated infection, persistent infection or reinfection.     If the Syphilis (Treponema) Antibody is positive and the RPR is negative, then a second Treponema specific test (TP-PA) will be performed to determine whether the antibody test is falsely positive or is detecting early infection.  If the latter is suspected, repeat testing in approximately two weeks is recommended.   Hepatitis C antibody   Result Value Ref Range    Hepatitis C Antibody Nonreactive Nonreactive      Comment:      A nonreactive screening test result does not exclude the possibility of exposure to or infection with HCV. Nonreactive screening test results in individuals with prior exposure to HCV may be due to antibody levels below the limit of detection of this assay or lack of reactivity to the HCV antigens used in this assay. Patients with recent HCV infections (<3 months from time of exposure) may have false-negative HCV antibody results due to the time needed for seroconversion (average of 8 to 9 weeks).   Rapid Plasma Reagin w Rflx to TITER   Result Value Ref Range    Rapid Plasma Reagin Nonreactive Nonreactive      Comment:      Specimen sent to [facility]) for confirmation.    Narrative    This test should not be used  as a primary diagnostic approach for syphilis, and a serum specimen should be submitted for a treponemal-specific antibody test.    Biological false-positive reactions with cardiolipin-type antigens have been reported in disease such as infectious mononucleosis, leprosy, malaria, lupus erythematosus, vaccinia, and viral pneumonia.  Pregnancy, autoimmune diseases, and narcotic additions may give false-positives. Pinta, yaws,bejel, and other treponemal diseases may also produce false-positive results with this test.   Treponema pallidum antibody confirm   Result Value Ref [facility]    T Pallidum by TP-PA conf Inconclusive Non Reactive      Comment:         Inconclusive; In the absence of historical or clinical   evidence of treponemal infection, this test result should   be considered equivocal. A second specimen drawn in 2 to 4   weeks should be submitted for serologic testing. Repeated   inconclusive results should be reported as Inconclusive for   further follow-up and/or confirmed by other methods such as   FTA-ABS.  Performed By: [facility]  [address] [place] [id number]  : Paramjit Noriega MD, PhD  CLIA Number: [id number]       If you have any questions or concerns, please call the clinic at the number listed above.       Sincerely,      Danish Roberts MD

## 2024-01-24 NOTE — PROGRESS NOTES
Preventive Care Visit  [facility]  Danish Roberts MD, Internal Medicine  Jan 24, 2024    SUBJECTIVE:   Will is a 53 year old, presenting for the following:  Physical and Recheck Medication (Pt reports that he is here to have his physical exam with lab work.)    HPI:  he has been well.  Wants to have STD exposure check.  No symptoms or specific concerns.  No unusual dyspnea or cough, or bowel or bladder issues.         1/24/2024     9:18 AM   Additional Questions   Roomed by supa   Accompanied by alone         1/24/2024     9:18 AM   Patient Reported Additional Medications   Patient reports taking the following new medications none     Healthy Habits:     Getting at least 3 servings of Calcium per day:  NO    Bi-annual eye exam:  Yes    Dental care twice a year:  Yes    Sleep apnea or symptoms of sleep apnea:  None    Diet:  Regular (no restrictions)    Frequency of exercise:  2-3 days/week    Duration of exercise:  15-30 minutes    Taking medications regularly:  Yes    Additional concerns today:  No    Today's PHQ-2 Score:       1/24/2024     9:10 AM   PHQ-2 ( 1999 [employer])   Q1: Little interest or pleasure in doing things 0   Q2: Feeling down, depressed or hopeless 0   PHQ-2 Score 0   Q1: Little interest or pleasure in doing things Not at all   Q2: Feeling down, depressed or hopeless Not at all   PHQ-2 Score 0     Social History     Tobacco Use    Smoking status: Never     Passive exposure: Never    Smokeless tobacco: Never   Substance Use Topics    Alcohol use: Yes     Comment: was drinking daily until 12/27 when I emptied house of alc.         1/24/2024     9:10 AM   Alcohol Use   Prescreen: >3 drinks/day or >7 drinks/week? No     Last PSA:   PSA   Date Value Ref [facility] Status   12/31/2020 0.53 0 - 4 ug/L Final     Comment:     Assay Method:  Chemiluminescence using Siemens Vista analyzer     Prostate Specific Antigen Screen   Date Value Ref Range Status   02/22/2023 0.49 0.00 - 3.50 ng/mL  Final     Reviewed orders with patient. Reviewed health maintenance and updated orders accordingly - Yes  Reviewed and updated as needed this visit by clinical staff  Tobacco  Allergies  Meds            Reviewed and updated as needed this visit by Provider     Past Medical History:   Diagnosis Date    Bacterial conjunctivitis of both eyes 10/17/2022    Chronic left shoulder pain 06/11/2016    Hyperlipidemia LDL goal <130     Palpitations 2000    Had negative holter monitor and neg stress 2001    Seasonal allergic rhinitis     Tear of medial meniscus of knee       Past Surgical History:   Procedure Laterality Date    GENITOURINARY SURGERY  03/02/2018    VAsectomy    ORTHOPEDIC SURGERY  05/13/2018    Plate/screws used to repair broken collarbone    TONSILLECTOMY & ADENOIDECTOMY       Review of Systems   Constitutional:  Negative for chills and fever.   HENT:  Negative for congestion, ear pain, hearing loss and sore throat.    Eyes:  Negative for pain and visual disturbance.   Respiratory:  Negative for cough and shortness of breath.    Cardiovascular:  Negative for chest pain and palpitations.   Gastrointestinal:  Negative for abdominal pain, constipation, diarrhea and nausea.   Genitourinary:  Negative for dysuria, frequency, genital sores, hematuria, penile discharge and urgency.   Musculoskeletal:  Negative for arthralgias, joint swelling and myalgias.   Skin:  Negative for rash.   Neurological:  Negative for dizziness, weakness and headaches.   Psychiatric/Behavioral:  The patient is not nervous/anxious.      OBJECTIVE:     PHYSICAL EXAM:  General Appearance: In no acute distress  Vitals:    01/24/24 0919   BP: 118/72   BP Location: Left arm   Patient Position: Sitting   Cuff Size: Adult Regular   Pulse: 60   Resp: 16   Temp: 97.7  F (36.5  C)   TempSrc: Tympanic   SpO2: 97%   Weight: 97.8 kg (215 lb 11.2 oz)   Height: 1.829 m (6')     Estimated body mass index is 29.25 kg/m  as calculated from the following:     Height as of this encounter: 1.829 m (6').    Weight as of this encounter: 97.8 kg (215 lb 11.2 oz).  SKIN: no concerning lesions  EYES: Clear, fundi are unremarkable   HEENT: nose and throat clear, ears normal   NECK:  without cervical or axillary adenopathy  RESPIRATORY: Clear   CARDIOVASCULAR: S1, S2  ABDOMEN: soft, flat, and non-tender, without mass, rebound, or guarding  RECTAL: deferred  GENITOURINARY: normal testes and phallus  EXTREMITIES:  no significant inflammation or edema, right knee has mild swelling  NEUROLOGIC: Speech is clear,  gait is normal  PSYCHIATRIC: Oriented X 3, thinking is clear     ASSESSMENT/PLAN:   Complex tear of medial meniscus of right knee as current injury, initial encounter  *surgery is planned.      Encounter for general health examination  Knee meniscus tear - will have surgery  - CBC with platelets  - Comprehensive metabolic panel    Hyperlipidemia LDL goal <100  Ongoing statin therapy.    - Lipid Profile (Chol, Trig, HDL, LDL calc)    Encounter for immunization  Influenza immunization    Screening for prostate cancer  - PSA, screen    STD exposure  - Chlamydia & Gonorrhea by PCR, [facility] - Clinic Collect  - HIV Antigen Antibody Combo  - Treponema Abs w Reflex to RPR and Titer  - HIV Antigen Antibody Combo  - Treponema Abs w Reflex to RPR and Titer    Encounter for HCV screening test for low risk patient  - Hepatitis C antibody    Counseling  Reviewed preventive health counseling, as reflected in patient instructions       Regular exercise       Healthy diet/nutrition       Prostate cancer screening    BMI  Estimated body mass index is 29.25 kg/m  as calculated from the following:    Height as of this encounter: 1.829 m (6').    Weight as of this encounter: 97.8 kg (215 lb 11.2 oz).     He reports that he has never smoked. He has never been exposed to tobacco smoke. He has never used smokeless tobacco.    Signed Electronically by: Danish Roberts MD  Answers submitted by  the patient for this visit:  Annual Preventive Visit (Submitted on 1/24/2024)  Chief Complaint: Annual Exam:  Blood in stool: No  heartburn: No  peripheral edema: No  mood changes: No  Skin sensation changes: No  impotence: No

## 2024-01-25 LAB
C TRACH DNA SPEC QL PROBE+SIG AMP: NEGATIVE
N GONORRHOEA DNA SPEC QL NAA+PROBE: NEGATIVE
RPR SER QL: NONREACTIVE
T PALLIDUM AB SER QL: REACTIVE

## 2024-01-30 LAB — T PALLIDUM AB SER QL AGGL: NORMAL

## 2024-02-05 ENCOUNTER — PATIENT OUTREACH (OUTPATIENT)
Dept: CARE COORDINATION | Facility: CLINIC | Age: 54
End: 2024-02-05
Payer: COMMERCIAL

## 2024-03-18 SDOH — HEALTH STABILITY: PHYSICAL HEALTH: ON AVERAGE, HOW MANY MINUTES DO YOU ENGAGE IN EXERCISE AT THIS LEVEL?: 30 MIN

## 2024-03-18 SDOH — HEALTH STABILITY: PHYSICAL HEALTH: ON AVERAGE, HOW MANY DAYS PER WEEK DO YOU ENGAGE IN MODERATE TO STRENUOUS EXERCISE (LIKE A BRISK WALK)?: 2 DAYS

## 2024-03-18 ASSESSMENT — SOCIAL DETERMINANTS OF HEALTH (SDOH)
HOW OFTEN DO YOU GET TOGETHER WITH FRIENDS OR RELATIVES?: MORE THAN THREE TIMES A WEEK
IN A TYPICAL WEEK, HOW MANY TIMES DO YOU TALK ON THE PHONE WITH FAMILY, FRIENDS, OR NEIGHBORS?: MORE THAN THREE TIMES A WEEK
DO YOU BELONG TO ANY CLUBS OR ORGANIZATIONS SUCH AS CHURCH GROUPS UNIONS, FRATERNAL OR ATHLETIC GROUPS, OR SCHOOL GROUPS?: YES
HOW OFTEN DO YOU ATTENT MEETINGS OF THE CLUB OR ORGANIZATION YOU BELONG TO?: MORE THAN 4 TIMES PER YEAR
HOW OFTEN DO YOU ATTEND CHURCH OR RELIGIOUS SERVICES?: NEVER

## 2024-03-18 ASSESSMENT — LIFESTYLE VARIABLES
HOW OFTEN DO YOU HAVE SIX OR MORE DRINKS ON ONE OCCASION: NEVER
HOW MANY STANDARD DRINKS CONTAINING ALCOHOL DO YOU HAVE ON A TYPICAL DAY: 1 OR 2
HOW OFTEN DO YOU HAVE A DRINK CONTAINING ALCOHOL: 2-3 TIMES A WEEK
AUDIT-C TOTAL SCORE: 3
SKIP TO QUESTIONS 9-10: 1

## 2024-03-19 ENCOUNTER — OFFICE VISIT (OUTPATIENT)
Dept: FAMILY MEDICINE | Facility: CLINIC | Age: 54
End: 2024-03-19
Payer: COMMERCIAL

## 2024-03-19 VITALS
WEIGHT: 216 LBS | TEMPERATURE: 97.8 F | HEIGHT: 72 IN | SYSTOLIC BLOOD PRESSURE: 130 MMHG | OXYGEN SATURATION: 98 % | RESPIRATION RATE: 16 BRPM | DIASTOLIC BLOOD PRESSURE: 83 MMHG | HEART RATE: 46 BPM | BODY MASS INDEX: 29.26 KG/M2

## 2024-03-19 DIAGNOSIS — Z87.828 HISTORY OF TORN MENISCUS OF RIGHT KNEE: Primary | ICD-10-CM

## 2024-03-19 DIAGNOSIS — E78.49 OTHER HYPERLIPIDEMIA: ICD-10-CM

## 2024-03-19 DIAGNOSIS — Z01.818 PREOPERATIVE EXAMINATION: ICD-10-CM

## 2024-03-19 LAB
ANION GAP SERPL CALCULATED.3IONS-SCNC: 11 MMOL/L (ref 7–15)
BUN SERPL-MCNC: 18.7 MG/DL (ref 6–20)
CALCIUM SERPL-MCNC: 9.4 MG/DL (ref 8.6–10)
CHLORIDE SERPL-SCNC: 104 MMOL/L (ref 98–107)
CREAT SERPL-MCNC: 1.1 MG/DL (ref 0.67–1.17)
DEPRECATED HCO3 PLAS-SCNC: 25 MMOL/L (ref 22–29)
EGFRCR SERPLBLD CKD-EPI 2021: 81 ML/MIN/1.73M2
GLUCOSE SERPL-MCNC: 102 MG/DL (ref 70–99)
HGB BLD-MCNC: 14.8 G/DL (ref 13.3–17.7)
POTASSIUM SERPL-SCNC: 4.6 MMOL/L (ref 3.4–5.3)
SODIUM SERPL-SCNC: 140 MMOL/L (ref 135–145)

## 2024-03-19 PROCEDURE — 36415 COLL VENOUS BLD VENIPUNCTURE: CPT | Performed by: FAMILY MEDICINE

## 2024-03-19 PROCEDURE — 99214 OFFICE O/P EST MOD 30 MIN: CPT | Performed by: FAMILY MEDICINE

## 2024-03-19 PROCEDURE — 85018 HEMOGLOBIN: CPT | Performed by: FAMILY MEDICINE

## 2024-03-19 PROCEDURE — 80048 BASIC METABOLIC PNL TOTAL CA: CPT | Performed by: FAMILY MEDICINE

## 2024-03-19 RX ORDER — ROSUVASTATIN CALCIUM 40 MG/1
40 TABLET, COATED ORAL DAILY
Qty: 90 TABLET | Refills: 1 | Status: SHIPPED | OUTPATIENT
Start: 2024-03-19 | End: 2024-09-12

## 2024-03-19 RX ORDER — ROSUVASTATIN CALCIUM 40 MG/1
40 TABLET, COATED ORAL DAILY
Qty: 90 TABLET | Refills: 1 | Status: SHIPPED | OUTPATIENT
Start: 2024-03-19 | End: 2024-03-19

## 2024-03-19 NOTE — PROGRESS NOTES
Preoperative Evaluation  [facility]  [address] [address] [facility] [address]  Phone: [phone]  Primary Provider: No Ref-Primary, Physician  Pre-op Performing Provider: ONEAL BAUER  Mar 19, 2024       Will is a 53 year old, presenting for the following:  Pre-Op Exam (Surgery 03/21/2024)        3/19/2024     7:38 AM   Additional Questions   Roomed by Wendy Hayward     Surgical Information  Surgery/Procedure: ARTHROSCOPY, right KNEE meniscal and chondral debridement   [facility] Location[facility]  Surgeon: Dr. Eddie Cid  Surgery Date: 03/21/22024  Time of Surgery: 7AM  Where patient plans to recover: At home with family  Fax number for surgical facility: Note does not need to be faxed, will be available electronically in Epic.    Assessment & Plan     The proposed surgical procedure is considered INTERMEDIATE risk.    Preoperative examination  Patient is cleared for upcoming procedure.  - Basic metabolic panel  - Hemoglobin  - Hemoglobin    History of torn meniscus of right knee    - Basic metabolic panel    Other hyperlipidemia    - rosuvastatin (CRESTOR) 40 MG tablet  Dispense: 90 tablet; Refill: 1              - No identified additional risk factors other than previously addressed    Antiplatelet or Anticoagulation Medication Instructions   - Patient is on no antiplatelet or anticoagulation medications.    Additional Medication Instructions  Patient is advised to take his rosuvastatin after procedure when able to drink fluids.    Recommendation  APPROVAL GIVEN to proceed with proposed procedure, without further diagnostic evaluation.          Subjective       HPI related to upcoming procedure: Patient has had right knee pain for 2-3 years, likely related to weight lifting related activity.         3/18/2024     2:32 PM   Preop Questions   1. Have you ever had a heart attack or stroke? No   2. Have you ever had surgery on your heart or blood vessels, such as a stent placement, a  coronary artery bypass, or surgery on an artery in your head, neck, heart, or legs? No   3. Do you have chest pain with activity? No   4. Do you have a history of  heart failure? No   5. Do you currently have a cold, bronchitis or symptoms of other infection? No   6. Do you have a cough, shortness of breath, or wheezing? No   7. Do you or anyone in your family have previous history of blood clots? No   8. Do you or does anyone in your family have a serious bleeding problem such as prolonged bleeding following surgeries or cuts? No   9. Have you ever had problems with anemia or been told to take iron pills? No   10. Have you had any abnormal blood loss such as black, tarry or bloody stools? No   11. Have you ever had a blood transfusion? No   12. Are you willing to have a blood transfusion if it is medically needed before, during, or after your surgery? Yes   13. Have you or any of your relatives ever had problems with anesthesia? No   14. Do you have sleep apnea, excessive snoring or daytime drowsiness? No   15. Do you have any artifical heart valves or other implanted medical devices like a pacemaker, defibrillator, or continuous glucose monitor? No   16. Do you have artificial joints? No   17. Are you allergic to latex? No       Health Care Directive  Patient does not have a Health Care Directive or Living Will: Discussed advance care planning with patient; information given to patient to review.    Preoperative Review of    reviewed - no record of controlled substances prescribed.          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     Priority: Medium    Impaired fasting glucose  02/19/2020     Priority: Medium    Family history of diabetes mellitus 01/09/2013     Priority: Medium    BMI 29.0-29.9,adult 01/09/2013     Priority: Medium    Hyperlipidemia LDL goal <130 10/31/2010     Priority: Medium    Familial hypercholesteremia 09/16/2010     Priority: Medium      Past Medical History:   Diagnosis Date    Bacterial conjunctivitis of both eyes 10/17/2022    Chronic left shoulder pain 06/11/2016    Hyperlipidemia LDL goal <130     Palpitations 2000    Had negative holter monitor and neg stress 2001    Seasonal allergic rhinitis     Tear of medial meniscus of knee      Past Surgical History:   Procedure Laterality Date    GENITOURINARY SURGERY  03/02/2018    VAsectomy    ORTHOPEDIC SURGERY  05/13/2018    Plate/screws used to repair broken collarbone    TONSILLECTOMY & ADENOIDECTOMY       Current Outpatient Medications   Medication Sig Dispense Refill    acyclovir (ZOVIRAX) 800 MG tablet Take 1 tablet (800 mg) by mouth 5 times daily As needed 90 tablet 1    rosuvastatin (CRESTOR) 40 MG tablet Take 1 tablet (40 mg) by mouth daily 90 tablet 1       No Known Allergies     Social History     Tobacco Use    Smoking status: Never     Passive exposure: Never    Smokeless tobacco: Never   Substance Use Topics    Alcohol use: Not Currently     Comment: was drinking daily until 12/27 when I emptied house of alc.       History   Drug Use No         Review of Systems    Review of Systems  Constitutional, neuro, ENT, endocrine, pulmonary, cardiac, gastrointestinal, genitourinary, musculoskeletal, integument and psychiatric systems are negative, except as otherwise noted.    Patient has right knee discomfort while walking, but still has normal range of motion at right knee.  He has discomfort towards the end of active knee flexion.    Objective    /83 (BP Location: Right arm, Patient Position: Chair, Cuff Size: Adult Large)   Pulse (!) 46   Temp 97.8  F (36.6  C) (Oral)   Resp 16   Ht 1.829 m (6')    Wt 98 kg (216 lb)   SpO2 98%   BMI 29.29 kg/m     Estimated body mass index is 29.29 kg/m  as calculated from the following:    Height as of this encounter: 1.829 m (6').    Weight as of this encounter: 98 kg (216 lb).  Physical Exam  General: Vital signs reviewed.  Patient is in no acute appearing distress.  Breathing appears nonlabored.  Patient is alert and oriented ×3.      ENT: Ear exam shows bilateral tympanic membranes to be clear without injection, nasal turbinates show no injection or edema, no pharyngeal injection or exudate.    Neck: supple with no adenoapthy, palpable abnormal masses, or thyroid abnormality.    Eyes: No scleral, lid, or periorbital injection or edema noted.  No eye mattering noted.  Corneas are clear. Pupils are equal round and reactive to light with normal consensual eye movement.    Heart: Heart rate is regular without murmur.    Lungs: Lungs are clear to auscultation with good airflow bilaterally.    Abdomen:  Abdomen is soft, nontender.  No palpable abnormal masses or organomegaly.  Bowel sounds are normal.    Genital exam: Patient declined exam for possible hernia.    Back: No areas of tenderness.    Skin: Warm and dry, with no rash or abnormal lesions noted.    Extremities: No lower leg edema noted.  No joint edema or restricted range of motion noted.    Neuro: No acute focal deficits or other abnormalities noted.    Psych: Patient is very pleasant, making good eye contact, with clear and fluent speech.  Answers questions appropriately. No psychomotor agitation.     Recent Labs   Lab Test 01/24/24  1047 02/22/23  1017   HGB 14.6  --      --     140   POTASSIUM 4.0 4.3   CR 0.88 1.03   A1C  --  5.5        Diagnostics  Recent Results (from the past 48 hour(s))   Basic metabolic panel    Collection Time: 03/19/24  8:38 AM   Result Value Ref Range    Sodium 140 135 - 145 mmol/L    Potassium 4.6 3.4 - 5.3 mmol/L    Chloride 104 98 - 107 mmol/L    Carbon Dioxide (CO2) 25  22 - 29 mmol/L    Anion Gap 11 7 - 15 mmol/L    Urea Nitrogen 18.7 6.0 - 20.0 mg/dL    Creatinine 1.10 0.67 - 1.17 mg/dL    GFR Estimate 81 >60 mL/min/1.73m2    Calcium 9.4 8.6 - 10.0 mg/dL    Glucose 102 (H) 70 - 99 mg/dL   Hemoglobin    Collection Time: 03/19/24  8:38 AM   Result Value Ref Range    Hemoglobin 14.8 13.3 - 17.7 g/dL      No EKG required, no history of coronary heart disease, significant arrhythmia, peripheral arterial disease or other structural heart disease.    Revised Cardiac Risk Index (RCRI)  The patient has the following serious cardiovascular risks for perioperative complications:   - No serious cardiac risks = 0 points     RCRI Interpretation: 0 points: Class I (very low risk - 0.4% complication rate)         Signed Electronically by: James Horan DO  Copy of this evaluation report is provided to requesting physician.

## 2024-03-19 NOTE — PATIENT INSTRUCTIONS
Preparing for Your Surgery  Getting started  A nurse will call you to review your health history and instructions. They will give you an arrival time based on your scheduled surgery time. Please be ready to share:  Your doctor's clinic name and phone number  Your medical, surgical, and anesthesia history  A list of allergies and sensitivities  A list of medicines, including herbal treatments and over-the-counter drugs  Whether the patient has a legal guardian (ask how to send us the papers in advance)  Please tell us if you're pregnant--or if there's any chance you might be pregnant. Some surgeries may injure a fetus (unborn baby), so they require a pregnancy test. Surgeries that are safe for a fetus don't always need a test, and you can choose whether to have one.   If you have a child who's having surgery, please ask for a copy of Preparing for Your Child's Surgery.    Preparing for surgery  Within 10 to 30 days of surgery: Have a pre-op exam (sometimes called an H&P, or History and Physical). This can be done at a clinic or pre-operative center.  If you're having a , you may not need this exam. Talk to your care team.  At your pre-op exam, talk to your care team about all medicines you take. If you need to stop any medicines before surgery, ask when to start taking them again.  We do this for your safety. Many medicines can make you bleed too much during surgery. Some change how well surgery (anesthesia) drugs work.  Call your insurance company to let them know you're having surgery. (If you don't have insurance, call [phone].)  Call your clinic if there's any change in your health. This includes signs of a cold or flu (sore throat, runny nose, cough, rash, fever). It also includes a scrape or scratch near the surgery site.  If you have questions on the day of surgery, call your hospital or surgery center.  Eating and drinking guidelines  For your safety: Unless your surgeon tells you otherwise,  follow the guidelines below.  Eat and drink as usual until 8 hours before you arrive for surgery. After that, no food or milk.  Drink clear liquids until 2 hours before you arrive. These are liquids you can see through, like water, Gatorade, and Propel Water. They also include plain black coffee and tea (no cream or milk), candy, and breath mints. You can spit out gum when you arrive.  If you drink alcohol: Stop drinking it the night before surgery.  If your care team tells you to take medicine on the morning of surgery, it's okay to take it with a sip of water.  Preventing infection  Shower or bathe the night before and morning of your surgery. Follow the instructions your clinic gave you. (If no instructions, use regular soap.)  Don't shave or clip hair near your surgery site. We'll remove the hair if needed.  Don't smoke or vape the morning of surgery. You may chew nicotine gum up to 2 hours before surgery. A nicotine patch is okay.  Note: Some surgeries require you to completely quit smoking and nicotine. Check with your surgeon.  Your care team will make every effort to keep you safe from infection. We will:  Clean our hands often with soap and water (or an alcohol-based hand rub).  Clean the skin at your surgery site with a special soap that kills germs.  Give you a special gown to keep you warm. (Cold raises the risk of infection.)  Wear special hair covers, masks, gowns and gloves during surgery.  Give antibiotic medicine, if prescribed. Not all surgeries need antibiotics.  What to bring on the day of surgery  Photo ID and insurance card  Copy of your health care directive, if you have one  Glasses and hearing aids (bring cases)  You can't wear contacts during surgery  Inhaler and eye drops, if you use them (tell us about these when you arrive)  CPAP machine or breathing device, if you use them  A few personal items, if spending the night  If you have . . .  A pacemaker, ICD (cardiac defibrillator) or other  implant: Bring the ID card.  An implanted stimulator: Bring the remote control.  A legal guardian: Bring a copy of the certified (court-stamped) guardianship papers.  Please remove any jewelry, including body piercings. Leave jewelry and other valuables at home.  If you're going home the day of surgery  You must have a responsible adult drive you home. They should stay with you overnight as well.  If you don't have someone to stay with you, and you aren't safe to go home alone, we may keep you overnight. Insurance often won't pay for this.  After surgery  If it's hard to control your pain or you need more pain medicine, please call your surgeon's office.  Questions?   If you have any questions for your care team, list them here: _________________________________________________________________________________________________________________________________________________________________________ ____________________________________ ____________________________________ ____________________________________  For informational purposes only. Not to replace the advice of your health care provider. Copyright   2003, 2019 [place] [employer]. All rights reserved. Clinically reviewed by Heide Rodas MD. [employer] 300766 - REV 12/22.    How to Take Your Medication Before Surgery  - Wait and take your rosuvastatin after you can drink  fluids after surgery.

## 2024-03-19 NOTE — H&P (VIEW-ONLY)
Preoperative Evaluation  [facility]  [address] [facility] [address]  Phone: [phone]  Primary Provider: No Ref-Primary, Physician  Pre-op Performing Provider: ONEAL BAUER  Mar 19, 2024       Will is a 53 year old, presenting for the following:  Pre-Op Exam (Surgery 03/21/2024)        3/19/2024     7:38 AM   Additional Questions   Roomed by Wendy Hayward     Surgical Information  Surgery/Procedure: ARTHROSCOPY, right KNEE meniscal and chondral debridement   [facility]  Surgeon: Dr. Eddie Cid  Surgery Date: 03/21/22024  Time of Surgery: 7AM  Where patient plans to recover: At home with family  Fax number for surgical facility: Note does not need to be faxed, will be available electronically in Epic.    Assessment & Plan     The proposed surgical procedure is considered INTERMEDIATE risk.    Preoperative examination  Patient is cleared for upcoming procedure.  - Basic metabolic panel  - Hemoglobin  - Hemoglobin    History of torn meniscus of right knee    - Basic metabolic panel    Other hyperlipidemia    - rosuvastatin (CRESTOR) 40 MG tablet  Dispense: 90 tablet; Refill: 1              - No identified additional risk factors other than previously addressed    Antiplatelet or Anticoagulation Medication Instructions   - Patient is on no antiplatelet or anticoagulation medications.    Additional Medication Instructions  Patient is advised to take his rosuvastatin after procedure when able to drink fluids.    Recommendation  APPROVAL GIVEN to proceed with proposed procedure, without further diagnostic evaluation.          Subjective       HPI related to upcoming procedure: Patient has had right knee pain for 2-3 years, likely related to weight lifting related activity.         3/18/2024     2:32 PM   Preop Questions   1. Have you ever had a heart attack or stroke? No   2. Have you ever had surgery on your heart or blood vessels, such as a stent placement, a  coronary artery bypass, or surgery on an artery in your head, neck, heart, or legs? No   3. Do you have chest pain with activity? No   4. Do you have a history of  heart failure? No   5. Do you currently have a cold, bronchitis or symptoms of other infection? No   6. Do you have a cough, shortness of breath, or wheezing? No   7. Do you or anyone in your family have previous history of blood clots? No   8. Do you or does anyone in your family have a serious bleeding problem such as prolonged bleeding following surgeries or cuts? No   9. Have you ever had problems with anemia or been told to take iron pills? No   10. Have you had any abnormal blood loss such as black, tarry or bloody stools? No   11. Have you ever had a blood transfusion? No   12. Are you willing to have a blood transfusion if it is medically needed before, during, or after your surgery? Yes   13. Have you or any of your relatives ever had problems with anesthesia? No   14. Do you have sleep apnea, excessive snoring or daytime drowsiness? No   15. Do you have any artifical heart valves or other implanted medical devices like a pacemaker, defibrillator, or continuous glucose monitor? No   16. Do you have artificial joints? No   17. Are you allergic to latex? No       Health Care Directive  Patient does not have a Health Care Directive or Living Will: Discussed advance care planning with patient; information given to patient to review.    Preoperative Review of    reviewed - no record of controlled substances prescribed.          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     Priority: Medium    Impaired fasting glucose  02/19/2020     Priority: Medium    Family history of diabetes mellitus 01/09/2013     Priority: Medium    BMI 29.0-29.9,adult 01/09/2013     Priority: Medium    Hyperlipidemia LDL goal <130 10/31/2010     Priority: Medium    Familial hypercholesteremia 09/16/2010     Priority: Medium      Past Medical History:   Diagnosis Date    Bacterial conjunctivitis of both eyes 10/17/2022    Chronic left shoulder pain 06/11/2016    Hyperlipidemia LDL goal <130     Palpitations 2000    Had negative holter monitor and neg stress 2001    Seasonal allergic rhinitis     Tear of medial meniscus of knee      Past Surgical History:   Procedure Laterality Date    GENITOURINARY SURGERY  03/02/2018    VAsectomy    ORTHOPEDIC SURGERY  05/13/2018    Plate/screws used to repair broken collarbone    TONSILLECTOMY & ADENOIDECTOMY       Current Outpatient Medications   Medication Sig Dispense Refill    acyclovir (ZOVIRAX) 800 MG tablet Take 1 tablet (800 mg) by mouth 5 times daily As needed 90 tablet 1    rosuvastatin (CRESTOR) 40 MG tablet Take 1 tablet (40 mg) by mouth daily 90 tablet 1       No Known Allergies     Social History     Tobacco Use    Smoking status: Never     Passive exposure: Never    Smokeless tobacco: Never   Substance Use Topics    Alcohol use: Not Currently     Comment: was drinking daily until 12/27 when I emptied house of alc.       History   Drug Use No         Review of Systems    Review of Systems  Constitutional, neuro, ENT, endocrine, pulmonary, cardiac, gastrointestinal, genitourinary, musculoskeletal, integument and psychiatric systems are negative, except as otherwise noted.    Patient has right knee discomfort while walking, but still has normal range of motion at right knee.  He has discomfort towards the end of active knee flexion.    Objective    /83 (BP Location: Right arm, Patient Position: Chair, Cuff Size: Adult Large)   Pulse (!) 46   Temp 97.8  F (36.6  C) (Oral)   Resp 16   Ht 1.829 m (6')    Wt 98 kg (216 lb)   SpO2 98%   BMI 29.29 kg/m     Estimated body mass index is 29.29 kg/m  as calculated from the following:    Height as of this encounter: 1.829 m (6').    Weight as of this encounter: 98 kg (216 lb).  Physical Exam  General: Vital signs reviewed.  Patient is in no acute appearing distress.  Breathing appears nonlabored.  Patient is alert and oriented ×3.      ENT: Ear exam shows bilateral tympanic membranes to be clear without injection, nasal turbinates show no injection or edema, no pharyngeal injection or exudate.    Neck: supple with no adenoapthy, palpable abnormal masses, or thyroid abnormality.    Eyes: No scleral, lid, or periorbital injection or edema noted.  No eye mattering noted.  Corneas are clear. Pupils are equal round and reactive to light with normal consensual eye movement.    Heart: Heart rate is regular without murmur.    Lungs: Lungs are clear to auscultation with good airflow bilaterally.    Abdomen:  Abdomen is soft, nontender.  No palpable abnormal masses or organomegaly.  Bowel sounds are normal.    Genital exam: Patient declined exam for possible hernia.    Back: No areas of tenderness.    Skin: Warm and dry, with no rash or abnormal lesions noted.    Extremities: No lower leg edema noted.  No joint edema or restricted range of motion noted.    Neuro: No acute focal deficits or other abnormalities noted.    Psych: Patient is very pleasant, making good eye contact, with clear and fluent speech.  Answers questions appropriately. No psychomotor agitation.     Recent Labs   Lab Test 01/24/24  1047 02/22/23  1017   HGB 14.6  --      --     140   POTASSIUM 4.0 4.3   CR 0.88 1.03   A1C  --  5.5        Diagnostics  Recent Results (from the past 48 hour(s))   Basic metabolic panel    Collection Time: 03/19/24  8:38 AM   Result Value Ref Range    Sodium 140 135 - 145 mmol/L    Potassium 4.6 3.4 - 5.3 mmol/L    Chloride 104 98 - 107 mmol/L    Carbon Dioxide (CO2) 25  22 - 29 mmol/L    Anion Gap 11 7 - 15 mmol/L    Urea Nitrogen 18.7 6.0 - 20.0 mg/dL    Creatinine 1.10 0.67 - 1.17 mg/dL    GFR Estimate 81 >60 mL/min/1.73m2    Calcium 9.4 8.6 - 10.0 mg/dL    Glucose 102 (H) 70 - 99 mg/dL   Hemoglobin    Collection Time: 03/19/24  8:38 AM   Result Value Ref Range    Hemoglobin 14.8 13.3 - 17.7 g/dL      No EKG required, no history of coronary heart disease, significant arrhythmia, peripheral arterial disease or other structural heart disease.    Revised Cardiac Risk Index (RCRI)  The patient has the following serious cardiovascular risks for perioperative complications:   - No serious cardiac risks = 0 points     RCRI Interpretation: 0 points: Class I (very low risk - 0.4% complication rate)         Signed Electronically by: James Horan DO  Copy of this evaluation report is provided to requesting physician.

## 2024-03-20 ENCOUNTER — ANESTHESIA EVENT (OUTPATIENT)
Dept: SURGERY | Facility: AMBULATORY SURGERY CENTER | Age: 54
End: 2024-03-20
Payer: COMMERCIAL

## 2024-03-20 NOTE — ANESTHESIA PREPROCEDURE EVALUATION
Anesthesia Pre-Procedure Evaluation    Patient: Quincy Harrell   MRN: [id number] : 1970        Procedure : Procedure(s):  ARTHROSCOPY, KNEE meniscal and chondral debridement          Past Medical History:   Diagnosis Date    Bacterial conjunctivitis of both eyes 10/17/2022    Chronic left shoulder pain 2016    Hyperlipidemia LDL goal <130     Palpitations     Had negative holter monitor and neg stress     Seasonal allergic rhinitis     Tear of medial meniscus of knee       Past Surgical History:   Procedure Laterality Date    GENITOURINARY SURGERY  2018    VAsectomy    ORTHOPEDIC SURGERY  2018    Plate/screws used to repair broken collarbone    TONSILLECTOMY & ADENOIDECTOMY        No Known Allergies   Social History     Tobacco Use    Smoking status: Never     Passive exposure: Never    Smokeless tobacco: Never   Substance Use Topics    Alcohol use: Not Currently     Comment: was drinking daily until  when I emptied house of alc.      Wt Readings from Last 1 Encounters:   24 98 kg (216 lb)        Anesthesia Evaluation   Pt has had prior anesthetic.     No history of anesthetic complications       ROS/MED HX  ENT/Pulmonary:       Neurologic:       Cardiovascular:     (+) Dyslipidemia - -   -  - -                                   (-) murmur   METS/Exercise Tolerance:     Hematologic:       Musculoskeletal:       GI/Hepatic:       Renal/Genitourinary:       Endo:       Psychiatric/Substance Use:       Infectious Disease:       Malignancy:       Other:            Physical Exam    Airway        Mallampati: I   TM distance: > 3 FB   Neck ROM: full   Mouth opening: > 3 cm    Respiratory Devices and Support         Dental       (+) Completely normal teeth      Cardiovascular   cardiovascular exam normal       Rhythm and rate: regular and normal (-) no murmur    Pulmonary   pulmonary exam normal        breath sounds clear to auscultation   (-) no rhonchi        OUTSIDE  "LABS:  CBC:   Lab Results   Component Value Date    WBC 5.3 01/24/2024    WBC 5.3 08/20/2021    HGB 14.8 03/19/2024    HGB 14.6 01/24/2024    HCT 42.8 01/24/2024    HCT 44.9 08/20/2021     01/24/2024     08/20/2021     BMP:   Lab Results   Component Value Date     03/19/2024     01/24/2024    POTASSIUM 4.6 03/19/2024    POTASSIUM 4.0 01/24/2024    CHLORIDE 104 03/19/2024    CHLORIDE 105 01/24/2024    CO2 25 03/19/2024    CO2 24 01/24/2024    BUN 18.7 03/19/2024    BUN 18.3 01/24/2024    CR 1.10 03/19/2024    CR 0.88 01/24/2024     (H) 03/19/2024    GLC 99 01/24/2024     COAGS: No results found for: \"PTT\", \"INR\", \"FIBR\"  POC: No results found for: \"BGM\", \"HCG\", \"HCGS\"  HEPATIC:   Lab Results   Component Value Date    ALBUMIN 4.6 01/24/2024    PROTTOTAL 7.5 01/24/2024    ALT 42 01/24/2024    AST 25 01/24/2024    ALKPHOS 86 01/24/2024    BILITOTAL 0.2 01/24/2024     OTHER:   Lab Results   Component Value Date    A1C 5.5 02/22/2023    HUGO 9.4 03/19/2024    TSH 1.55 12/31/2020    SED 7 12/26/2011       Anesthesia Plan    ASA Status:  2    NPO Status:  NPO Appropriate    Anesthesia Type: General.   Induction: Intravenous.   Maintenance: Balanced.        Consents    Anesthesia Plan(s) and associated risks, benefits, and realistic alternatives discussed. Questions answered and patient/representative(s) expressed understanding.     - Discussed:     - Discussed with:  Patient      - Extended Intubation/Ventilatory Support Discussed: No.      - Patient is DNR/DNI Status: No     Use of blood products discussed: No .     Postoperative Care       PONV prophylaxis: Ondansetron (or other 5HT-3)     Comments:    Other Comments: Anxiolytic/Sedating meds prior to procedure:N/A  Discussed common and potentially harmful risks for General Anesthesia.   These risks include, but were not limited to: Conversion to secured airway, Sore throat, Airway injury, Dental injury, Aspiration, PONV  Risks of invasive " procedures were not discussed: N/A    All questions were answered.             Ulysses Palacios MD    I have reviewed the pertinent notes and labs in the chart from the past 30 days and (re)examined the patient.  Any updates or changes from those notes are reflected in this note.

## 2024-03-21 ENCOUNTER — ANESTHESIA (OUTPATIENT)
Dept: SURGERY | Facility: AMBULATORY SURGERY CENTER | Age: 54
End: 2024-03-21
Payer: COMMERCIAL

## 2024-03-21 ENCOUNTER — HOSPITAL ENCOUNTER (OUTPATIENT)
Facility: AMBULATORY SURGERY CENTER | Age: 54
Discharge: HOME | End: 2024-03-21
Attending: ORTHOPAEDIC SURGERY | Admitting: ORTHOPAEDIC SURGERY
Payer: COMMERCIAL

## 2024-03-21 VITALS
WEIGHT: 216 LBS | SYSTOLIC BLOOD PRESSURE: 122 MMHG | OXYGEN SATURATION: 98 % | HEIGHT: 72 IN | DIASTOLIC BLOOD PRESSURE: 86 MMHG | RESPIRATION RATE: 16 BRPM | TEMPERATURE: 97.1 F | BODY MASS INDEX: 29.26 KG/M2 | HEART RATE: 48 BPM

## 2024-03-21 DIAGNOSIS — S83.231D COMPLEX TEAR OF MEDIAL MENISCUS OF RIGHT KNEE AS CURRENT INJURY, SUBSEQUENT ENCOUNTER: Primary | ICD-10-CM

## 2024-03-21 PROCEDURE — G8916 PT W IV AB GIVEN ON TIME: HCPCS

## 2024-03-21 PROCEDURE — 29880 ARTHRS KNE SRG MNISECTMY M&L: CPT | Mod: RT | Performed by: ORTHOPAEDIC SURGERY

## 2024-03-21 PROCEDURE — 29880 ARTHRS KNE SRG MNISECTMY M&L: CPT | Performed by: NURSE ANESTHETIST, CERTIFIED REGISTERED

## 2024-03-21 PROCEDURE — 29880 ARTHRS KNE SRG MNISECTMY M&L: CPT | Performed by: ANESTHESIOLOGY

## 2024-03-21 PROCEDURE — G8907 PT DOC NO EVENTS ON DISCHARG: HCPCS

## 2024-03-21 PROCEDURE — 29880 ARTHRS KNE SRG MNISECTMY M&L: CPT | Mod: RT

## 2024-03-21 RX ORDER — NALOXONE HYDROCHLORIDE 0.4 MG/ML
0.1 INJECTION, SOLUTION INTRAMUSCULAR; INTRAVENOUS; SUBCUTANEOUS
Status: DISCONTINUED | OUTPATIENT
Start: 2024-03-21 | End: 2024-03-22 | Stop reason: HOSPADM

## 2024-03-21 RX ORDER — ASPIRIN 325 MG
325 TABLET ORAL 2 TIMES DAILY WITH MEALS
Qty: 28 TABLET | Refills: 0 | Status: SHIPPED | OUTPATIENT
Start: 2024-03-21 | End: 2024-04-04

## 2024-03-21 RX ORDER — KETOROLAC TROMETHAMINE 30 MG/ML
INJECTION, SOLUTION INTRAMUSCULAR; INTRAVENOUS PRN
Status: DISCONTINUED | OUTPATIENT
Start: 2024-03-21 | End: 2024-03-21

## 2024-03-21 RX ORDER — CEFAZOLIN SODIUM 2 G/50ML
2 SOLUTION INTRAVENOUS SEE ADMIN INSTRUCTIONS
Status: DISCONTINUED | OUTPATIENT
Start: 2024-03-21 | End: 2024-03-22 | Stop reason: HOSPADM

## 2024-03-21 RX ORDER — ONDANSETRON 2 MG/ML
4 INJECTION INTRAMUSCULAR; INTRAVENOUS EVERY 30 MIN PRN
Status: DISCONTINUED | OUTPATIENT
Start: 2024-03-21 | End: 2024-03-22 | Stop reason: HOSPADM

## 2024-03-21 RX ORDER — ACETAMINOPHEN 325 MG/1
975 TABLET ORAL ONCE
Status: COMPLETED | OUTPATIENT
Start: 2024-03-21 | End: 2024-03-21

## 2024-03-21 RX ORDER — ONDANSETRON 4 MG/1
4 TABLET, ORALLY DISINTEGRATING ORAL EVERY 30 MIN PRN
Status: DISCONTINUED | OUTPATIENT
Start: 2024-03-21 | End: 2024-03-22 | Stop reason: HOSPADM

## 2024-03-21 RX ORDER — SODIUM CHLORIDE, SODIUM LACTATE, POTASSIUM CHLORIDE, CALCIUM CHLORIDE 600; 310; 30; 20 MG/100ML; MG/100ML; MG/100ML; MG/100ML
INJECTION, SOLUTION INTRAVENOUS CONTINUOUS PRN
Status: DISCONTINUED | OUTPATIENT
Start: 2024-03-21 | End: 2024-03-21

## 2024-03-21 RX ORDER — FENTANYL CITRATE 50 UG/ML
50 INJECTION, SOLUTION INTRAMUSCULAR; INTRAVENOUS EVERY 5 MIN PRN
Status: DISCONTINUED | OUTPATIENT
Start: 2024-03-21 | End: 2024-03-22 | Stop reason: HOSPADM

## 2024-03-21 RX ORDER — HYDROCODONE BITARTRATE AND ACETAMINOPHEN 5; 325 MG/1; MG/1
1-2 TABLET ORAL EVERY 6 HOURS PRN
Qty: 10 TABLET | Refills: 0 | Status: SHIPPED | OUTPATIENT
Start: 2024-03-21

## 2024-03-21 RX ORDER — SODIUM CHLORIDE, SODIUM LACTATE, POTASSIUM CHLORIDE, CALCIUM CHLORIDE 600; 310; 30; 20 MG/100ML; MG/100ML; MG/100ML; MG/100ML
INJECTION, SOLUTION INTRAVENOUS CONTINUOUS
Status: DISCONTINUED | OUTPATIENT
Start: 2024-03-21 | End: 2024-03-22 | Stop reason: HOSPADM

## 2024-03-21 RX ORDER — ONDANSETRON 2 MG/ML
INJECTION INTRAMUSCULAR; INTRAVENOUS PRN
Status: DISCONTINUED | OUTPATIENT
Start: 2024-03-21 | End: 2024-03-21

## 2024-03-21 RX ORDER — BUPIVACAINE HYDROCHLORIDE 2.5 MG/ML
INJECTION, SOLUTION INFILTRATION; PERINEURAL PRN
Status: DISCONTINUED | OUTPATIENT
Start: 2024-03-21 | End: 2024-03-21 | Stop reason: HOSPADM

## 2024-03-21 RX ORDER — FENTANYL CITRATE 50 UG/ML
25 INJECTION, SOLUTION INTRAMUSCULAR; INTRAVENOUS EVERY 5 MIN PRN
Status: DISCONTINUED | OUTPATIENT
Start: 2024-03-21 | End: 2024-03-22 | Stop reason: HOSPADM

## 2024-03-21 RX ORDER — CEFAZOLIN SODIUM 2 G/50ML
2 SOLUTION INTRAVENOUS
Status: COMPLETED | OUTPATIENT
Start: 2024-03-21 | End: 2024-03-21

## 2024-03-21 RX ORDER — OXYCODONE HYDROCHLORIDE 5 MG/1
5 TABLET ORAL
Status: COMPLETED | OUTPATIENT
Start: 2024-03-21 | End: 2024-03-21

## 2024-03-21 RX ORDER — FENTANYL CITRATE 50 UG/ML
INJECTION, SOLUTION INTRAMUSCULAR; INTRAVENOUS PRN
Status: DISCONTINUED | OUTPATIENT
Start: 2024-03-21 | End: 2024-03-21

## 2024-03-21 RX ORDER — DEXAMETHASONE SODIUM PHOSPHATE 10 MG/ML
INJECTION, SOLUTION INTRAMUSCULAR; INTRAVENOUS PRN
Status: DISCONTINUED | OUTPATIENT
Start: 2024-03-21 | End: 2024-03-21

## 2024-03-21 RX ORDER — LIDOCAINE 40 MG/G
CREAM TOPICAL
Status: DISCONTINUED | OUTPATIENT
Start: 2024-03-21 | End: 2024-03-22 | Stop reason: HOSPADM

## 2024-03-21 RX ORDER — AMOXICILLIN 250 MG
1-2 CAPSULE ORAL 2 TIMES DAILY
Qty: 30 TABLET | Refills: 0 | Status: SHIPPED | OUTPATIENT
Start: 2024-03-21

## 2024-03-21 RX ORDER — PROPOFOL 10 MG/ML
INJECTION, EMULSION INTRAVENOUS PRN
Status: DISCONTINUED | OUTPATIENT
Start: 2024-03-21 | End: 2024-03-21

## 2024-03-21 RX ORDER — HYDROXYZINE HYDROCHLORIDE 25 MG/1
25 TABLET, FILM COATED ORAL
Status: DISCONTINUED | OUTPATIENT
Start: 2024-03-21 | End: 2024-03-22 | Stop reason: HOSPADM

## 2024-03-21 RX ORDER — EPHEDRINE SULFATE 50 MG/ML
INJECTION, SOLUTION INTRAMUSCULAR; INTRAVENOUS; SUBCUTANEOUS PRN
Status: DISCONTINUED | OUTPATIENT
Start: 2024-03-21 | End: 2024-03-21

## 2024-03-21 RX ORDER — ONDANSETRON 4 MG/1
4 TABLET, ORALLY DISINTEGRATING ORAL
Status: DISCONTINUED | OUTPATIENT
Start: 2024-03-21 | End: 2024-03-22 | Stop reason: HOSPADM

## 2024-03-21 RX ORDER — LIDOCAINE HYDROCHLORIDE 10 MG/ML
INJECTION, SOLUTION INFILTRATION; PERINEURAL PRN
Status: DISCONTINUED | OUTPATIENT
Start: 2024-03-21 | End: 2024-03-21

## 2024-03-21 RX ORDER — GLYCOPYRROLATE 0.2 MG/ML
INJECTION, SOLUTION INTRAMUSCULAR; INTRAVENOUS PRN
Status: DISCONTINUED | OUTPATIENT
Start: 2024-03-21 | End: 2024-03-21

## 2024-03-21 RX ADMIN — FENTANYL CITRATE 50 MCG: 50 INJECTION, SOLUTION INTRAMUSCULAR; INTRAVENOUS at 06:57

## 2024-03-21 RX ADMIN — EPHEDRINE SULFATE 5 MG: 50 INJECTION, SOLUTION INTRAMUSCULAR; INTRAVENOUS; SUBCUTANEOUS at 07:19

## 2024-03-21 RX ADMIN — GLYCOPYRROLATE 0.1 MCG: 0.2 INJECTION, SOLUTION INTRAMUSCULAR; INTRAVENOUS at 07:13

## 2024-03-21 RX ADMIN — LIDOCAINE HYDROCHLORIDE 50 MG: 10 INJECTION, SOLUTION INFILTRATION; PERINEURAL at 06:57

## 2024-03-21 RX ADMIN — SODIUM CHLORIDE, SODIUM LACTATE, POTASSIUM CHLORIDE, CALCIUM CHLORIDE: 600; 310; 30; 20 INJECTION, SOLUTION INTRAVENOUS at 06:57

## 2024-03-21 RX ADMIN — KETOROLAC TROMETHAMINE 30 MG: 30 INJECTION, SOLUTION INTRAMUSCULAR; INTRAVENOUS at 07:37

## 2024-03-21 RX ADMIN — ACETAMINOPHEN 975 MG: 325 TABLET ORAL at 06:30

## 2024-03-21 RX ADMIN — OXYCODONE HYDROCHLORIDE 5 MG: 5 TABLET ORAL at 08:07

## 2024-03-21 RX ADMIN — GLYCOPYRROLATE 0.1 MCG: 0.2 INJECTION, SOLUTION INTRAMUSCULAR; INTRAVENOUS at 07:10

## 2024-03-21 RX ADMIN — DEXAMETHASONE SODIUM PHOSPHATE 4 MG: 10 INJECTION, SOLUTION INTRAMUSCULAR; INTRAVENOUS at 07:06

## 2024-03-21 RX ADMIN — SODIUM CHLORIDE, SODIUM LACTATE, POTASSIUM CHLORIDE, CALCIUM CHLORIDE: 600; 310; 30; 20 INJECTION, SOLUTION INTRAVENOUS at 06:47

## 2024-03-21 RX ADMIN — CEFAZOLIN SODIUM 2 G: 2 SOLUTION INTRAVENOUS at 06:57

## 2024-03-21 RX ADMIN — PROPOFOL 200 MG: 10 INJECTION, EMULSION INTRAVENOUS at 06:57

## 2024-03-21 RX ADMIN — FENTANYL CITRATE 50 MCG: 50 INJECTION, SOLUTION INTRAMUSCULAR; INTRAVENOUS at 07:21

## 2024-03-21 RX ADMIN — ONDANSETRON 4 MG: 2 INJECTION INTRAMUSCULAR; INTRAVENOUS at 07:27

## 2024-03-21 NOTE — ANESTHESIA CARE TRANSFER NOTE
Patient: Quincy Harrell    Procedure: Procedure(s):  ARTHROSCOPY, KNEE Medial and lateral partial meniscectomy       Diagnosis: Complex tear of medial meniscus of right knee as current injury, initial encounter [S83.231A]  Diagnosis Additional Information: No value filed.    Anesthesia Type:   General     Note:    Oropharynx: oropharynx clear of all foreign objects and spontaneously breathing  Level of Consciousness: drowsy  Oxygen Supplementation: face mask    Independent Airway: airway patency satisfactory and stable  Dentition: dentition unchanged  Vital Signs Stable: post-procedure vital signs reviewed and stable  Report to RN Given: handoff report given  Patient transferred to: PACU    Handoff Report: Identifed the Patient, Identified the Reponsible Provider, Reviewed the pertinent medical history, Discussed the surgical course, Reviewed Intra-OP anesthesia mangement and issues during anesthesia, Set expectations for post-procedure period and Allowed opportunity for questions and acknowledgement of understanding  Vitals:  Vitals Value Taken Time   BP     Temp     Pulse     Resp     SpO2         Electronically Signed By: ROSETTA Xiao CRNA  March 21, 2024  7:59 AM

## 2024-03-21 NOTE — OP NOTE
DATE OF SERVICE:  3/21/2024    PREOPERATIVE DIAGNOSIS:  right knee, medial meniscus tear, patello-femoral chondrosis    POSTOPERATIVE DIAGNOSIS: right knee, medial meniscus tear, lateral meniscus tear, chondrosis    OPERATION PERFORMED:  1. right knee, arthroscopic partial medial meniscus debridement   2. Right knee arthroscopic partial lateral meniscus debridement.  3. right knee, arthroscopic medial plica resection  4. right knee, arthroscopy shaving chondroplasty patella.    ATTENDING SURGEON: Eddie Cid M.D., M.S.    ASSISTANT(S): Dani Vicente PA-C    ANESTHESIA:  General, in the supine position.    IV FLUIDS:  700 mL LR.    EBL: 2mL  .    URINE OUTPUT: no francisco    TOURNIQUET TIME: 16 minutes at 250mmHg.    IMPLANTS / GRAFTS:     none    COMPLICATIONS:  NONE    ANTIBIOTICS:  Cefazolin 2 gm intravenously prior to tourniquet inflation     SPECIMENS: none    FINDINGS:   no effusion, mild suprapatellar synovitis. no loose bodies in the medial and lateral gutters, intact anterior cruciate ligament within the notch, mild patellar chondrosis at apex, intact femoral trochlea,  no lateral chondrosis, Complex tear lateral meniscus tear (20% radial tear at posterior root, flap tear posterior horn undersurface, flap tear apex anterior horn), mild medial chondrosis, complex medial meniscus tear with undersurface flap and horizontal cleavage component.    DRAINS: none.    INDICATIONS FOR PROCEDURE: Quincy Harrell is a 53 year old male with ongoing right knee.  Pain has been present for 3 years.   relates to working out and sports over the years. He did hyperextend 3 yers ago doing hamstrings machine, but got better. Then 2 years ago over-extended it again while stretching, got better. Then tweaked it 9 months ago lifting weights.     He locates pain along the inner aspect of the knee, worse with prolonged sitting, sleeping, then getting up to walk.     Treatment with home exercise program, which helps, but only  when he goes to therapy.     MRI right knee from 12/7/2023   1.  Complex tear of the junction posterior horn and body of the medial meniscus.  2.  Nonspecific edema-like marrow signal of the medial femoral condyle which may reflect a contusion.  3.  Intact lateral meniscus, and major ligaments and tendons.    * Discussed treatment options including nonoperative treatment with continued rest, ice, elevation, activity modification, NSAIDS and Physical Therapy, bracing and potential injections versus surgical treatment with arthroscopy and meniscal repair versus debridement, possible chondral debridement. Risks and benefits of each discussed in detail.  * in the setting of underlying chondrosis, predictability of arthroscopy is uncertain, unless mechanical symptoms present due to the meniscus tear.    * surgical risks discussed: bleeding, infection, pain, scar, damage to adjacent structures (nerve, vessels, cartilage), stiffness, post-traumatic arthritis, failure to relieve symptoms, recurrence of symptoms, blood clots (DVT), pulmonary emolism, risks of anesthesia and death. This surgery is not intended nor expected to alleviate arthritic pain symptoms, nor will it treat or correct underlying arthritic changes. Arthritis and symptoms related to arthritis could worsen with arthroscopy and meniscal and/or chondral debridement. Patient understands.    * understanding the risks of surgery, as a quality of life decision, they elected to proceed with arthroscopy        PROCEDURE AND FINDINGS:    The patient was identified in the preoperative holding area. The correct surgical procedure and procedureal site was confirmed with the patient. Again, the risks of surgery were reviewed. The patient elected to proceed understanding the risks. Written informed consent was obtained. The correct surgical site was marked with an indelible marker by myself, Eddie Cid MD, the surgeon.     The patient was then taken to the operating  room and placed supine on the operating table. After adequate anesthesia was obtained, a non-sterile tourniquet was placed to the right upper thigh. All bony prominences were well padded. The arms were well positioned and padded to be comfortable. The right knee was prepped and draped in the usual sterile fashion.    A time-out was completed confirming the correct patient, the correct surgery and surgical site, positioning, the availability of instruments and implants, the administration of prophylactic antibiotics, and review of known allergies by all surgical staff.    At that time, the right lower extremity was elevated and ensanguinated with an emarch, tourniquet inflated. A standard lateral arthroscopy portal was made and the camera introduced. Upon entering the suprapatellar pouch, there was no effusion, mild suprapatellar synovitis. no loose bodies. Medial and lateral gutters were examined. There was noted to be mild chondrosis of the patella and minimal chondrosis of the femoral trochlea. Upon entering the notch, the anterior cruciate ligament appeared grossly intact. At that time, a medial portal was made guided with a spinal needle. Probe was introduced and probing the anterior cruciate ligament confirmed it was intact.     The knee was then placed into figure-4 position, noting diffuse areas of tearing of the lateral meniscus as noted above, probing superior and inferior surfaces. There was minimal chondrosis of the lateral articular surfaces.    At that time, using an oscillating debrider, the lateral meniscus tears were debrided back until stable with probing.    The knee was then placed into extension and back into the suprapatellar pouch, resecting off a medial plica.     Using the oscillating debrider, a shaving chondroplasty of the patella apex was performed.    Then to the medial compartment, using gentle valgus stress. Upon examination, noting complex undersurface tearing of the medial meniscus,  probing superior and inferior surfaces. There was mild chondrosis of the medial tibia and mild chondrosis of the medial femoral condyle.    At that time, alternating between the oscillating debrider and meniscal biter, the medial meniscus tear was debrided back until stable, confirmed with probing.    A final diagnostic examination of the knee was performed, removing any loose cartilage or soft tissue components. The remaining fluid was drained from the knee and instruments were removed. Wounds were closed with 3-0 prolene, steri strips. The wounds were injected with 0.25% marcaine plain. This was followed by a well padded soft dressing and compression wrap. The tourniquet was deflated.    The patient was then taken to recovery in stable condition.    The postoperative plan will be weight bearing as tolerated, home exercise program. Oral pain medications will include Norco versus over the counter. Stool softeners while on pain medications. Deep vein thrombosis prophylaxis with daily 325mg ASA x2 weeks. Patient will return to clinic 2 weeks time, sooner if needed, for wound check, suture removal. Physical Therapy will be determined at that time.  We look forward to following the patient through the perioperative time period.    Eddie Cid M.D., M.S.  Dept. of Orthopaedic Surgery  [facility]

## 2024-03-21 NOTE — INTERVAL H&P NOTE
I have reviewed the surgical (or preoperative) H&P that is linked to this encounter, and examined the patient. There are no significant changes    Clinical Conditions Present on Arrival:  Clinically Significant Risk Factors Present on Admission                  # Overweight: Estimated body mass index is 29.29 kg/m  as calculated from the following:    Height as of this encounter: 1.829 m (6').    Weight as of this encounter: 98 kg (216 lb).     The History and Physical on patient's chart was personally reviewed today with the patient. there have been no interval changes in patient's history since H+P performed.    History:  Quincy Harrell is a 53 year old male seen for evaluation of ongoing right knee.  Pain has been present for 3 years.   relates to working out and sports over the years. He did hyperextend 3 yers ago doing hamstrings machine, but got better. Then 2 years ago over-extended it again while stretching, got better. Then tweaked it 9 months ago lifting weights.     He locates pain along the inner aspect of the knee, worse with prolonged sitting, sleeping, then getting up to walk.     Treatment with home exercise program, which helps, but only when he goes to therapy. Does not take over the counter pain medications, topicals.     Denies low back pain. Denies numbness and tingling.    X-RAY:  bilateral galindo and sunrise views, lateral views right knee from 7/14/2023  -- no obvious fractures or dislocations.   1.  Right knee: Normal joint spacing and alignment. No fracture or joint effusion. Small superior patellar spur.  2.  Left knee: Negative.     MRI:  MRI right knee from 12/7/2023    1.  Complex tear of the junction posterior horn and body of the medial meniscus.  2.  Nonspecific edema-like marrow signal of the medial femoral condyle which may reflect a contusion.  3.  Intact lateral meniscus, and major ligaments and tendons.       ASSESSMENT/PLAN: Quincy Harrell is a 53  year old male with chronic right knee pain, complex medial meniscus tear.      * discussed exam and imaging findings with them, what appears to be a complex medial meniscal tear on MRI, as well as some underlying arthritic changes, which is consistent with symptoms and physical examination findings.      * Discussed treatment options including nonoperative treatment with continued rest, ice, elevation, activity modification, NSAIDS and Physical Therapy, bracing and potential injections versus surgical treatment with arthroscopy and meniscal repair versus debridement, possible chondral debridement. Risks and benefits of each discussed in detail.  * in the setting of underlying chondrosis, predictability of arthroscopy is uncertain, unless mechanical symptoms present due to the meniscus tear.     * surgical risks discussed: bleeding, infection, pain, scar, damage to adjacent structures (nerve, vessels, cartilage), stiffness, post-traumatic arthritis, failure to relieve symptoms, recurrence of symptoms, blood clots (DVT), pulmonary emolism, risks of anesthesia and death. This surgery is not intended nor expected to alleviate arthritic pain symptoms, nor will it treat or correct underlying arthritic changes. Arthritis and symptoms related to arthritis could worsen with arthroscopy and meniscal and/or chondral debridement. Patient understands.     * understanding the risks of surgery, as a quality of life decision, they elected to proceed with surgery.     * plan: RIGHT knee arthroscopy with partial meniscal debridement, possible chondral debridement, outpatient surgery    Patient elects to proceed with planned procedure. Right knee arthroscopy, meniscal/chondral debridement. Outpatient.    Risks and perceived benefits of surgery again discussed with patient. Patient's questions addressed and answered. Written informed consent obtained and reviewed. Surgical site marked with indelible marker with patient's participation  after confirming site with patient.      Eddie Cid M.D., M.S.  Dept. of Orthopaedic Surgery  [facility]

## 2024-03-21 NOTE — ANESTHESIA POSTPROCEDURE EVALUATION
Patient: Quincy Harrell    Procedure: Procedure(s):  ARTHROSCOPY, KNEE Medial and lateral partial meniscectomy       Anesthesia Type:  General    Note:  Disposition: Outpatient   Postop Pain Control: Uneventful            Sign Out: Well controlled pain   PONV: No   Neuro/Psych: Uneventful            Sign Out: Acceptable/Baseline neuro status   Airway/Respiratory: Uneventful   CV/Hemodynamics: Uneventful            Sign Out: Acceptable CV status; No obvious hypovolemia; No obvious fluid overload   Other NRE: NONE   DID A NON-ROUTINE EVENT OCCUR? No           Last vitals:  Vitals Value Taken Time   /92 03/21/24 0815   Temp 97.1  F (36.2  C) 03/21/24 0815   Pulse 67 03/21/24 0815   Resp 11 03/21/24 0815   SpO2 99 % 03/21/24 0815   Vitals shown include unfiled device data.    Electronically Signed By: Ulysses Palacios MD  March 21, 2024  1:25 PM

## 2024-03-21 NOTE — DISCHARGE INSTRUCTIONS
Name: Quincy Harrell MRN #: [id number]  Date: 3/21/2024  Procedure: right knee arthroscopy, meniscal debridement.  Discharge to home when stable, tolerating clear liquids, and patient has urinated  Call for follow-up appointment, [phone], with Dr. Cid in:  2 weeks.   WOUND CARE  The bandage may be slightly bloody. This is normal.  Ice:  Keep an ice bag on your knee for 20 minutes at a time.  Keep incisions clean and dry following surgery for:  72 hours   Change all bandages in:  72 hours       If bandages are changed before follow-up, cover all incisions with fresh bandages or bandaids.  O.K. to shower (may get incision wet) in:  72 hours  No tub baths, swimming pools, hot tubs, etc. for a minimum of 2 weeks following surgery  ACTIVITY  Keep leg elevated on a pillow placed under ankle. Do not keep pillow under your knee.  Weight-bearing ([place]):  Weight-bear as tolerated     May discontinue crutches in 2-3 days if able to walk without a limp.  Bracing: no brace needed  Range of motion limits: no limit. Work to regain full knee range of motion.  Exercises:  Perform exercises 3 times a day for a minimum of 25 reps each time (start today or tomorrow):             Quadriceps sets  Calf Pumps Straight leg raises  Heels Slides   Start Physical Therapy: will  discuss if needed at your first postoperative visit.  OK to drive:  Not until cleared by Dr Cid  When going back to driving, be sure to test braking/acceleration maneuvers in an empty parking lot before entry into any traffic areas.    ABSOLUTELY NO DRIVING WHILE TAKING NARCOTICS!    DISCHARGE MEDICATIONS:   Aspirin 325 mg, 1 tablet, take twice a day for 14 days then stop (to prevent blood clots) (over the counter)  Norco (5/325), 1 to 2 tablets, take every  6 hours as needed for pain   Other: stool softeners while on pain medications  Ok to take over the counter medications such as ibuprofen, acetaminophen instead of prescription pain  medications as needed.  Strong pain medication has been prescribed. Use as directed. Do not combine with alcohol. Be careful as you walk or climb stairs.   DIET:  If no nausea, clear liquids should be taken initially.  Then progress to solid foods when clear liquids are tolerated.   RESPONSE TO SURGERY: It is normal to have pain and swelling in your knee after surgery. It may take 4 weeks or longer for the swelling to go away. It is also common to notice some bruising around the knee, thigh, and calf as the swelling resolves.  EMERGENCY: Call or return for any fevers (temperature greater than 101.5   or sustained fevers greater than 100.5   that haven t resolved within 3 to 4 days following surgery) or chills, increasing pain, swelling, redness, calf pain, drainage (especially if yellow, green, or foul smelling), excessive bleeding), chest pain, shortness of breath:  Phone #: [phone]; If emergency, go to local ER or dial 911.    Eddie Cid M.D., M.S.  Dept. of Orthopaedic Surgery  [facility]    3/21/2024        KNEE SURGERY - HOME EXERCISE PROGRAM    All exercises to be performed at least 3 times per day.     Quad Sets  Sit with leg extended  Tighten quad muscles in front of leg, trying to  push back of knee downward  Hold exercise for 10 seconds  Rest 10 seconds between reps  Perform 1 set of 20 reps, 3 times a day     Heel Slides   Lie on back with legs straight  Slide heel to buttocks   Return to start position  Repeat with other leg  Perform 1 rep every 4 seconds  Perform 3 sets of 20 reps, 3 times a day  Rest 1 minute between sets     Ankle Pumps   Lie on back with foot elevated on pillow  Move foot up and down, pumping ankle  Perform 3 sets of 20 reps, 3 times a day  Perform 1 rep every 4 seconds  Rest 1 minute between sets     Straight Leg Raise  Lie on back with uninvolved knee bent  Raise straight leg to thigh level of bend leg  Return to starting position  Perform 3 sets of 20  reps, 3 times a day  Perform 1 rep every 4 seconds  Rest 1 minute between sets     Tylenol 975 mg was given at 630 am.    You should not take more then 4,000 mg of tylenol/acetaminophen in a 24 hour period.      NSAIDS last at 730 am.

## 2024-03-21 NOTE — ANESTHESIA PROCEDURE NOTES
Airway       Patient location during procedure: OR  Staff -        CRNA: Marybel Niño APRN CRNA       Performed By: CRNA  Consent for Airway        Urgency: elective  Indications and Patient Condition       Indications for airway management: emerson-procedural       Induction type:intravenous       Mask difficulty assessment: 1 - vent by mask    Final Airway Details       Final airway type: supraglottic airway    Supraglottic Airway Details        Type: LMA       Brand: I-Gel       LMA size: 5    Post intubation assessment        Placement verified by: capnometry, equal breath sounds and chest rise        Number of attempts at approach: 1       Number of other approaches attempted: 0       Secured with: plastic tape       Ease of procedure: easy       Dentition: Intact and Unchanged

## 2024-03-26 NOTE — PROGRESS NOTES
"Chief Complaint   Patient presents with    Right Knee - Surgical Followup     Right knee arthroscopy, medial and lateral debridement. DOS: 3/21/24. 10 days post-op. Doing great. Never really had any pain. Just swelling and stiff.       SURGERY: ([facility] [place] )  1. right knee, arthroscopic partial medial meniscus debridement   2. Right knee arthroscopic partial lateral meniscus debridement.  3. right knee, arthroscopic medial plica resection  4. right knee, arthroscopy shaving chondroplasty patella.  DATE OF SURGERY: 3/21/2024.      HISTORY OF PRESENT ILLNESS:  Quincy Harrell is a 53 year old male seen for postoperative evaluation of a right knee arthroscopy and medial and lateral meniscus debridement. Surgery occurred 2 weeks ago. Returns today stating he's doing \"great\". . Pain has been minimal, never really had too much pain postoperative, just swelling and stiffness. No problems with the surgical wounds. Denies fevers chills or night sweats. No associated numbness or tingling. Has been doing home exercise program  since surgery as recommended. Taking aspirin daily.         OR FINDINGS:   no effusion, mild suprapatellar synovitis. no loose bodies in the medial and lateral gutters, intact anterior cruciate ligament within the notch, mild patellar chondrosis at apex, intact femoral trochlea,  no lateral chondrosis, Complex tear lateral meniscus tear (20% radial tear at posterior root, flap tear posterior horn undersurface, flap tear apex anterior horn), mild medial chondrosis, complex medial meniscus tear with undersurface flap and horizontal cleavage component.    Past Medical History:   Diagnosis Date    Bacterial conjunctivitis of both eyes 10/17/2022    Chronic left shoulder pain 06/11/2016    Hyperlipidemia LDL goal <130     Palpitations 2000    Had negative holter monitor and neg stress 2001    Seasonal allergic rhinitis     Tear of medial meniscus of knee  "       Past Surgical History:   Procedure Laterality Date    GENITOURINARY SURGERY  03/02/2018    VAsectomy    ORTHOPEDIC SURGERY  05/13/2018    Plate/screws used to repair broken collarbone    TONSILLECTOMY & ADENOIDECTOMY         Medications:   Current Outpatient Medications:     acyclovir (ZOVIRAX) 800 MG tablet, Take 1 tablet (800 mg) by mouth 5 times daily As needed, Disp: 90 tablet, Rfl: 1    aspirin (ASA) 325 MG tablet, Take 1 tablet (325 mg) by mouth 2 times daily (with meals) for 14 days, Disp: 28 tablet, Rfl: 0    HYDROcodone-acetaminophen (NORCO) 5-325 MG tablet, Take 1-2 tablets by mouth every 6 hours as needed for moderate to severe pain, Disp: 10 tablet, Rfl: 0    rosuvastatin (CRESTOR) 40 MG tablet, Take 1 tablet (40 mg) by mouth daily, Disp: 90 tablet, Rfl: 1    senna-docusate (SENOKOT-S/PERICOLACE) 8.6-50 MG tablet, Take 1-2 tablets by mouth 2 times daily, Disp: 30 tablet, Rfl: 0    Allergies: No Known Allergies    REVIEW OF SYSTEMS:   CONSTITUTIONAL:NEGATIVE for fever, chills, night sweats  INTEGUMENTARY/SKIN: NEGATIVE for worrisome wound problems or redness.  MUSCULOSKELETAL:See HPI above  NEURO: NEGATIVE for weakness, dizziness or paresthesias    PHYSICAL EXAM:  Ht 1.829 m (6')   Wt 98 kg (216 lb)   BMI 29.29 kg/m     GENERAL APPEARANCE: healthy, alert, no distress  SKIN: no suspicious lesions or rashes  NEURO: Normal strength and tone, mentation intact and speech normal  PSYCH:  mentation appears normal and affect normal, not anxious  RESPIRATORY: No increased work of breathing.    right  LOWER EXTREMITY:  Gait:   quadriceps avoidance right.  No Quad atrophy, strength normal.  Intact sensation deep peroneal nerve, superficial peroneal nerve, med/lat tibial nerve, sural nerve, saphenous nerve  Intact EHL, EDL, TA, FHL, GS, quadriceps hamstrings and hip flexors  Toes warm and well perfused, brisk capillary refill. Palpable 2+ dp pulses.  calf soft and nttp or squeeze.  Edema: trace    right  KNEE  EXAM:    Skin: intact, no ecchymosis or erythema  Incisions: skin edges well approximated, sutures in place. Dry. No erythema.  ROM: full extension to 95 flexion  Effusion: large  Tender: incisional, medial joint line.       X-RAY: none indicated.      Impression: Quincy Harrell is a 53 year old male 2 weeks status post right knee arthroscopy and medial and lateral meniscus debridement, doing well.       Plan: routine postoperative knee arthroscopy  * suture removal  Surgical images reviewed with patient today.  Pain, swelling will gradually decreased over time. Activities then gradually increase over time.    * WB status: weight bearing as tolerated . Cautioned not to overdo it too quickly. Increased activities too soon will lead to more swelling of the knee and leg, more pain, slower recovery. Expect 6-8 weeks.    * Rest  * Activity modification - avoid activities that aggravate symptoms.  * NSAIDS - regular use for inflammation, with food, as long as no contra-indications. Please discuss with pcp if needed.  * Ice twice daily to three times daily as needed.  * Compression wrap as needed.  * Elevation of extremity to reduce swelling as needed.  * PT ordered for strengthening, stretching and range of motion exercises, effusion control.  * Tylenol as needed for pain  * return to clinic  as needed.      * We did also discuss that based on the amount of arthritic changes seen at the time of surgery, may have continued knee pain due to the arthritis. Once recovered from the knee arthroscopy, and arthritic symptoms persist, consider full treatment of arthritis starting with Physical Therapy and injections, NSAIDS, activity modification, bracing.      Eddie Cid M.D., M.S.  Dept. of Orthopaedic Surgery  [facility]

## 2024-04-01 ENCOUNTER — OFFICE VISIT (OUTPATIENT)
Dept: ORTHOPEDICS | Facility: CLINIC | Age: 54
End: 2024-04-01
Payer: COMMERCIAL

## 2024-04-01 VITALS — HEIGHT: 72 IN | BODY MASS INDEX: 29.26 KG/M2 | WEIGHT: 216 LBS

## 2024-04-01 DIAGNOSIS — Z98.890 S/P ARTHROSCOPY OF RIGHT KNEE: Primary | ICD-10-CM

## 2024-04-01 PROCEDURE — 99024 POSTOP FOLLOW-UP VISIT: CPT | Performed by: ORTHOPAEDIC SURGERY

## 2024-04-01 ASSESSMENT — PAIN SCALES - GENERAL: PAINLEVEL: NO PAIN (1)

## 2024-04-01 NOTE — LETTER
"    4/1/2024         RE: Quincy Harrell  [address] [place] [address]        Dear Colleague,    Thank you for referring your patient, Quincy Harrell, to the [facility]. Please see a copy of my visit note below.    Chief Complaint   Patient presents with     Right Knee - Surgical Followup     Right knee arthroscopy, medial and lateral debridement. DOS: 3/21/24. 10 days post-op. Doing great. Never really had any pain. Just swelling and stiff.       SURGERY: ([facility] )  1. right knee, arthroscopic partial medial meniscus debridement   2. Right knee arthroscopic partial lateral meniscus debridement.  3. right knee, arthroscopic medial plica resection  4. right knee, arthroscopy shaving chondroplasty patella.  DATE OF SURGERY: 3/21/2024.      HISTORY OF PRESENT ILLNESS:  Quincy Harrell is a 53 year old male seen for postoperative evaluation of a right knee arthroscopy and medial and lateral meniscus debridement. Surgery occurred 2 weeks ago. Returns today stating he's doing \"great\". . Pain has been minimal, never really had too much pain postoperative, just swelling and stiffness. No problems with the surgical wounds. Denies fevers chills or night sweats. No associated numbness or tingling. Has been doing home exercise program  since surgery as recommended. Taking aspirin daily.         OR FINDINGS:   no effusion, mild suprapatellar synovitis. no loose bodies in the medial and lateral gutters, intact anterior cruciate ligament within the notch, mild patellar chondrosis at apex, intact femoral trochlea,  no lateral chondrosis, Complex tear lateral meniscus tear (20% radial tear at posterior root, flap tear posterior horn undersurface, flap tear apex anterior horn), mild medial chondrosis, complex medial meniscus tear with undersurface flap and horizontal cleavage component.    Past Medical History: "   Diagnosis Date     Bacterial conjunctivitis of both eyes 10/17/2022     Chronic left shoulder pain 06/11/2016     Hyperlipidemia LDL goal <130      Palpitations 2000    Had negative holter monitor and neg stress 2001     Seasonal allergic rhinitis      Tear of medial meniscus of knee        Past Surgical History:   Procedure Laterality Date     GENITOURINARY SURGERY  03/02/2018    VAsectomy     ORTHOPEDIC SURGERY  05/13/2018    Plate/screws used to repair broken collarbone     TONSILLECTOMY & ADENOIDECTOMY         Medications:   Current Outpatient Medications:      acyclovir (ZOVIRAX) 800 MG tablet, Take 1 tablet (800 mg) by mouth 5 times daily As needed, Disp: 90 tablet, Rfl: 1     aspirin (ASA) 325 MG tablet, Take 1 tablet (325 mg) by mouth 2 times daily (with meals) for 14 days, Disp: 28 tablet, Rfl: 0     HYDROcodone-acetaminophen (NORCO) 5-325 MG tablet, Take 1-2 tablets by mouth every 6 hours as needed for moderate to severe pain, Disp: 10 tablet, Rfl: 0     rosuvastatin (CRESTOR) 40 MG tablet, Take 1 tablet (40 mg) by mouth daily, Disp: 90 tablet, Rfl: 1     senna-docusate (SENOKOT-S/PERICOLACE) 8.6-50 MG tablet, Take 1-2 tablets by mouth 2 times daily, Disp: 30 tablet, Rfl: 0    Allergies: No Known Allergies    REVIEW OF SYSTEMS:   CONSTITUTIONAL:NEGATIVE for fever, chills, night sweats  INTEGUMENTARY/SKIN: NEGATIVE for worrisome wound problems or redness.  MUSCULOSKELETAL:See HPI above  NEURO: NEGATIVE for weakness, dizziness or paresthesias    PHYSICAL EXAM:  Ht 1.829 m (6')   Wt 98 kg (216 lb)   BMI 29.29 kg/m     GENERAL APPEARANCE: healthy, alert, no distress  SKIN: no suspicious lesions or rashes  NEURO: Normal strength and tone, mentation intact and speech normal  PSYCH:  mentation appears normal and affect normal, not anxious  RESPIRATORY: No increased work of breathing.    right  LOWER EXTREMITY:  Gait:   quadriceps avoidance right.  No Quad atrophy, strength normal.  Intact sensation deep  peroneal nerve, superficial peroneal nerve, med/lat tibial nerve, sural nerve, saphenous nerve  Intact EHL, EDL, TA, FHL, GS, quadriceps hamstrings and hip flexors  Toes warm and well perfused, brisk capillary refill. Palpable 2+ dp pulses.  calf soft and nttp or squeeze.  Edema: trace    right  KNEE EXAM:    Skin: intact, no ecchymosis or erythema  Incisions: skin edges well approximated, sutures in place. Dry. No erythema.  ROM: full extension to 95 flexion  Effusion: large  Tender: incisional, medial joint line.       X-RAY: none indicated.      Impression: Quincy Harrell is a 53 year old male 2 weeks status post right knee arthroscopy and medial and lateral meniscus debridement, doing well.       Plan: routine postoperative knee arthroscopy  * suture removal  Surgical images reviewed with patient today.  Pain, swelling will gradually decreased over time. Activities then gradually increase over time.    * WB status: weight bearing as tolerated . Cautioned not to overdo it too quickly. Increased activities too soon will lead to more swelling of the knee and leg, more pain, slower recovery. Expect 6-8 weeks.    * Rest  * Activity modification - avoid activities that aggravate symptoms.  * NSAIDS - regular use for inflammation, with food, as long as no contra-indications. Please discuss with pcp if needed.  * Ice twice daily to three times daily as needed.  * Compression wrap as needed.  * Elevation of extremity to reduce swelling as needed.  * PT ordered for strengthening, stretching and range of motion exercises, effusion control.  * Tylenol as needed for pain  * return to clinic  as needed.      * We did also discuss that based on the amount of arthritic changes seen at the time of surgery, may have continued knee pain due to the arthritis. Once recovered from the knee arthroscopy, and arthritic symptoms persist, consider full treatment of arthritis starting with Physical Therapy and injections,  NSAIDS, activity modification, bracing.      Eddie Cid M.D., M.S.  Dept. of Orthopaedic Surgery  [facility]       Again, thank you for allowing me to participate in the care of your patient.        Sincerely,        Eddie Cid MD

## 2024-05-06 ENCOUNTER — OFFICE VISIT (OUTPATIENT)
Dept: ORTHOPEDICS | Facility: CLINIC | Age: 54
End: 2024-05-06
Payer: COMMERCIAL

## 2024-05-06 VITALS
HEART RATE: 60 BPM | DIASTOLIC BLOOD PRESSURE: 76 MMHG | SYSTOLIC BLOOD PRESSURE: 118 MMHG | HEIGHT: 72 IN | BODY MASS INDEX: 27.77 KG/M2 | WEIGHT: 205 LBS

## 2024-05-06 DIAGNOSIS — Z98.890 S/P ARTHROSCOPY OF RIGHT KNEE: Primary | ICD-10-CM

## 2024-05-06 PROCEDURE — 99024 POSTOP FOLLOW-UP VISIT: CPT | Performed by: ORTHOPAEDIC SURGERY

## 2024-05-06 ASSESSMENT — PAIN SCALES - GENERAL: PAINLEVEL: MILD PAIN (3)

## 2024-05-06 NOTE — LETTER
"    5/6/2024         RE: Quincy Harrell  [address] [place] [address]        Dear Colleague,    Thank you for referring your patient, Quincy Harrell, to the [facility]. Please see a copy of my visit note below.    Chief Complaint   Patient presents with     Right Knee - Surgical Followup     Right knee arthroscopy. DOS: 3/21/24. 6 weeks s/p. Worse with sudden movements, standing, walking, prolonged sitting. Anterior knee. Tx: home exercises, icing. No interim injury.        SURGERY: ([facility] )  1. right knee, arthroscopic partial medial meniscus debridement   2. Right knee arthroscopic partial lateral meniscus debridement.  3. right knee, arthroscopic medial plica resection  4. right knee, arthroscopy shaving chondroplasty patella.  DATE OF SURGERY: 3/21/2024.      HISTORY OF PRESENT ILLNESS:  Quincy Harrell is a 53 year old male seen for postoperative evaluation of a right knee arthroscopy and medial and lateral meniscus debridement. Surgery occurred 6.5 weeks ago. Returns today stating he's been having some pain.  Immediate postoperative was doing \"great\", but since then his recovery has plateaued, even up and down. Last week was really uncomfortable so made the appointment. Since then he's decreased activities and he's had improvements. He  has pain in the front of the knee with sudden movements, prolonged sitting/standing, walking. No injury he can recall. Up until a week ago felt \"numbness\" across the knee, but now that is better and turned to pain. He's been taking it easy the past days and starting to improve.    Treatment with home exercises, icing..    No problems with the surgical wounds. Denies fevers chills or night sweats. No associated numbness or tingling. Has been doing home exercise program  since surgery as recommended. Taking aspirin daily.         OR FINDINGS:   no " effusion, mild suprapatellar synovitis. no loose bodies in the medial and lateral gutters, intact anterior cruciate ligament within the notch, mild patellar chondrosis at apex, intact femoral trochlea,  no lateral chondrosis, Complex tear lateral meniscus tear (20% radial tear at posterior root, flap tear posterior horn undersurface, flap tear apex anterior horn), mild medial chondrosis, complex medial meniscus tear with undersurface flap and horizontal cleavage component.    Past Medical History:   Diagnosis Date     Bacterial conjunctivitis of both eyes 10/17/2022     Chronic left shoulder pain 06/11/2016     Hyperlipidemia LDL goal <130      Palpitations 2000    Had negative holter monitor and neg stress 2001     Seasonal allergic rhinitis      Tear of medial meniscus of knee        Past Surgical History:   Procedure Laterality Date     ARTHROSCOPY KNEE Right 3/21/2024    Procedure: ARTHROSCOPY, KNEE Medial and lateral partial meniscectomy;  Surgeon: Eddie Cid MD;  Location: [facility] OR     GENITOURINARY SURGERY  03/02/2018    VAsectomy     ORTHOPEDIC SURGERY  05/13/2018    Plate/screws used to repair broken collarbone     TONSILLECTOMY & ADENOIDECTOMY         Medications:   Current Outpatient Medications:      acyclovir (ZOVIRAX) 800 MG tablet, Take 1 tablet (800 mg) by mouth 5 times daily As needed, Disp: 90 tablet, Rfl: 1     HYDROcodone-acetaminophen (NORCO) 5-325 MG tablet, Take 1-2 tablets by mouth every 6 hours as needed for moderate to severe pain, Disp: 10 tablet, Rfl: 0     rosuvastatin (CRESTOR) 40 MG tablet, Take 1 tablet (40 mg) by mouth daily, Disp: 90 tablet, Rfl: 1     senna-docusate (SENOKOT-S/PERICOLACE) 8.6-50 MG tablet, Take 1-2 tablets by mouth 2 times daily, Disp: 30 tablet, Rfl: 0    Allergies: No Known Allergies    REVIEW OF SYSTEMS:   CONSTITUTIONAL:NEGATIVE for fever, chills, night sweats  INTEGUMENTARY/SKIN: NEGATIVE for worrisome wound problems or redness.  MUSCULOSKELETAL:See HPI  above  NEURO: NEGATIVE for weakness, dizziness or paresthesias    PHYSICAL EXAM:  /76   Pulse 60   Ht 1.829 m (6')   Wt 93 kg (205 lb)   BMI 27.80 kg/m     GENERAL APPEARANCE: healthy, alert, no distress  SKIN: no suspicious lesions or rashes  NEURO: Normal strength and tone, mentation intact and speech normal  PSYCH:  mentation appears normal and affect normal, not anxious  RESPIRATORY: No increased work of breathing.    right  LOWER EXTREMITY:  Gait: mild  quadriceps avoidance right.  No Quad atrophy, strength normal.  Intact sensation deep peroneal nerve, superficial peroneal nerve, med/lat tibial nerve, sural nerve, saphenous nerve  Intact EHL, EDL, TA, FHL, GS, quadriceps hamstrings and hip flexors  calf soft and nttp or squeeze.  Edema:none    right  KNEE EXAM:    Skin: intact, no ecchymosis or erythema  Incisions: well healed. Dry. No erythema.  ROM: full extension to 135 flexion with anterior discomfort.  Effusion: trace  Tender: incisional, medial patello-femoral.  Nontender to palpation medial and lateral joint line.      X-RAY: none indicated.      Impression: Quincy Harrell is a 53 year old male 6.5 weeks status post right knee arthroscopy and medial and lateral meniscus debridement, patello-femoral pain.      Plan: routine postoperative knee arthroscopy  Likely some patello-femoral pain post-surgical from swelling, mechanics, increased activities.  This will gradually resolve on its own.    *  Cautioned not to overdo it too quickly. Increased activities too soon will lead to more swelling of the knee and leg, more pain, slower recovery.   Discussed therapy again, as referral placed at last visit, but he'll work on his own.  Discussed trial of cortisone injection to see if can calm things down for him, but he declines today.    * Rest  * Activity modification - avoid activities that aggravate symptoms.  * NSAIDS - regular use for inflammation, with food, as long as no  contra-indications. Please discuss with pcp if needed.  * Ice twice daily to three times daily as needed.  * Compression wrap as needed.  * Tylenol as needed for pain    * return to clinic  as needed.      * We did also discuss that based on the amount of arthritic changes seen at the time of surgery, may have continued knee pain due to the arthritis. Once recovered from the knee arthroscopy, and arthritic symptoms persist, consider full treatment of arthritis starting with Physical Therapy and injections, NSAIDS, activity modification, bracing.      Eddie Cid M.D., M.S.  Dept. of Orthopaedic Surgery  [facility]       Again, thank you for allowing me to participate in the care of your patient.        Sincerely,        Eddie Cid MD

## 2024-05-06 NOTE — PROGRESS NOTES
"Chief Complaint   Patient presents with    Right Knee - Surgical Followup     Right knee arthroscopy. DOS: 3/21/24. 6 weeks s/p. Worse with sudden movements, standing, walking, prolonged sitting. Anterior knee. Tx: home exercises, icing. No interim injury.        SURGERY: ([facility] )  1. right knee, arthroscopic partial medial meniscus debridement   2. Right knee arthroscopic partial lateral meniscus debridement.  3. right knee, arthroscopic medial plica resection  4. right knee, arthroscopy shaving chondroplasty patella.  DATE OF SURGERY: 3/21/2024.      HISTORY OF PRESENT ILLNESS:  Quincy Harrell is a 53 year old male seen for postoperative evaluation of a right knee arthroscopy and medial and lateral meniscus debridement. Surgery occurred 6.5 weeks ago. Returns today stating he's been having some pain.  Immediate postoperative was doing \"great\", but since then his recovery has plateaued, even up and down. Last week was really uncomfortable so made the appointment. Since then he's decreased activities and he's had improvements. He  has pain in the front of the knee with sudden movements, prolonged sitting/standing, walking. No injury he can recall. Up until a week ago felt \"numbness\" across the knee, but now that is better and turned to pain. He's been taking it easy the past days and starting to improve.    Treatment with home exercises, icing..    No problems with the surgical wounds. Denies fevers chills or night sweats. No associated numbness or tingling. Has been doing home exercise program  since surgery as recommended. Taking aspirin daily.         OR FINDINGS:   no effusion, mild suprapatellar synovitis. no loose bodies in the medial and lateral gutters, intact anterior cruciate ligament within the notch, mild patellar chondrosis at apex, intact femoral trochlea,  no lateral chondrosis, Complex tear lateral meniscus tear (20% radial tear at " posterior root, flap tear posterior horn undersurface, flap tear apex anterior horn), mild medial chondrosis, complex medial meniscus tear with undersurface flap and horizontal cleavage component.    Past Medical History:   Diagnosis Date    Bacterial conjunctivitis of both eyes 10/17/2022    Chronic left shoulder pain 06/11/2016    Hyperlipidemia LDL goal <130     Palpitations 2000    Had negative holter monitor and neg stress 2001    Seasonal allergic rhinitis     Tear of medial meniscus of knee        Past Surgical History:   Procedure Laterality Date    ARTHROSCOPY KNEE Right 3/21/2024    Procedure: ARTHROSCOPY, KNEE Medial and lateral partial meniscectomy;  Surgeon: Eddie Cid MD;  Location: [facility] OR    GENITOURINARY SURGERY  03/02/2018    VAsectomy    ORTHOPEDIC SURGERY  05/13/2018    Plate/screws used to repair broken collarbone    TONSILLECTOMY & ADENOIDECTOMY         Medications:   Current Outpatient Medications:     acyclovir (ZOVIRAX) 800 MG tablet, Take 1 tablet (800 mg) by mouth 5 times daily As needed, Disp: 90 tablet, Rfl: 1    HYDROcodone-acetaminophen (NORCO) 5-325 MG tablet, Take 1-2 tablets by mouth every 6 hours as needed for moderate to severe pain, Disp: 10 tablet, Rfl: 0    rosuvastatin (CRESTOR) 40 MG tablet, Take 1 tablet (40 mg) by mouth daily, Disp: 90 tablet, Rfl: 1    senna-docusate (SENOKOT-S/PERICOLACE) 8.6-50 MG tablet, Take 1-2 tablets by mouth 2 times daily, Disp: 30 tablet, Rfl: 0    Allergies: No Known Allergies    REVIEW OF SYSTEMS:   CONSTITUTIONAL:NEGATIVE for fever, chills, night sweats  INTEGUMENTARY/SKIN: NEGATIVE for worrisome wound problems or redness.  MUSCULOSKELETAL:See HPI above  NEURO: NEGATIVE for weakness, dizziness or paresthesias    PHYSICAL EXAM:  /76   Pulse 60   Ht 1.829 m (6')   Wt 93 kg (205 lb)   BMI 27.80 kg/m     GENERAL APPEARANCE: healthy, alert, no distress  SKIN: no suspicious lesions or rashes  NEURO: Normal strength and tone, mentation  intact and speech normal  PSYCH:  mentation appears normal and affect normal, not anxious  RESPIRATORY: No increased work of breathing.    right  LOWER EXTREMITY:  Gait: mild  quadriceps avoidance right.  No Quad atrophy, strength normal.  Intact sensation deep peroneal nerve, superficial peroneal nerve, med/lat tibial nerve, sural nerve, saphenous nerve  Intact EHL, EDL, TA, FHL, GS, quadriceps hamstrings and hip flexors  calf soft and nttp or squeeze.  Edema:none    right  KNEE EXAM:    Skin: intact, no ecchymosis or erythema  Incisions: well healed. Dry. No erythema.  ROM: full extension to 135 flexion with anterior discomfort.  Effusion: trace  Tender: incisional, medial patello-femoral.  Nontender to palpation medial and lateral joint line.      X-RAY: none indicated.      Impression: Quincy Harrell is a 53 year old male 6.5 weeks status post right knee arthroscopy and medial and lateral meniscus debridement, patello-femoral pain.      Plan: routine postoperative knee arthroscopy  Likely some patello-femoral pain post-surgical from swelling, mechanics, increased activities.  This will gradually resolve on its own.    *  Cautioned not to overdo it too quickly. Increased activities too soon will lead to more swelling of the knee and leg, more pain, slower recovery.   Discussed therapy again, as referral placed at last visit, but he'll work on his own.  Discussed trial of cortisone injection to see if can calm things down for him, but he declines today.    * Rest  * Activity modification - avoid activities that aggravate symptoms.  * NSAIDS - regular use for inflammation, with food, as long as no contra-indications. Please discuss with pcp if needed.  * Ice twice daily to three times daily as needed.  * Compression wrap as needed.  * Tylenol as needed for pain    * return to clinic  as needed.      * We did also discuss that based on the amount of arthritic changes seen at the time of surgery, may have  continued knee pain due to the arthritis. Once recovered from the knee arthroscopy, and arthritic symptoms persist, consider full treatment of arthritis starting with Physical Therapy and injections, NSAIDS, activity modification, bracing.      Eddie Cid M.D., M.S.  Dept. of Orthopaedic Surgery  [facility]

## 2024-07-07 NOTE — PROGRESS NOTES
Problem: Skin/Tissue Integrity  Goal: Absence of new skin breakdown  Description: 1.  Monitor for areas of redness and/or skin breakdown  2.  Assess vascular access sites hourly  3.  Every 4-6 hours minimum:  Change oxygen saturation probe site  4.  Every 4-6 hours:  If on nasal continuous positive airway pressure, respiratory therapy assess nares and determine need for appliance change or resting period.  Outcome: Progressing     Problem: ABCDS Injury Assessment  Goal: Absence of physical injury  Outcome: Progressing     Problem: Safety - Adult  Goal: Free from fall injury  Outcome: Progressing      SUBJECTIVE:   CC: Kel is an 52 year old who presents for preventative health visit.     Chief Complaint   Patient presents with     Physical     Pt. Nonfasting.     Cholesterol    Desires STI testing.    BP: elevated today.  Stressful workday.      Nutrition: cooks most of his food.  He does eat meat.  He eats veggies.      Patient has been advised of split billing requirements and indicates understanding: Yes  Healthy Habits:     Getting at least 3 servings of Calcium per day:  NO    Bi-annual eye exam:  NO    Dental care twice a year:  Yes    Sleep apnea or symptoms of sleep apnea:  None    Diet:  Regular (no restrictions)    Frequency of exercise:  4-5 days/week    Duration of exercise:  15-30 minutes    Taking medications regularly:  Yes    Medication side effects:  None    PHQ-2 Total Score: 0    Additional concerns today:  Yes    Today's PHQ-2 Score:   PHQ-2 ( 1999 [employer]) 2/18/2023   Q1: Little interest or pleasure in doing things 0   Q2: Feeling down, depressed or hopeless 0   PHQ-2 Score 0   PHQ-2 Total Score (12-17 Years)- Positive if 3 or more points; Administer PHQ-A if positive -   Q1: Little interest or pleasure in doing things Not at all   Q2: Feeling down, depressed or hopeless Not at all   PHQ-2 Score 0     Social History     Tobacco Use     Smoking status: Never     Passive exposure: Never     Smokeless tobacco: Never   Substance Use Topics     Alcohol use: Yes     Comment: was drinking daily until 12/27 when I emptied house of alc.     Alcohol Use 2/18/2023   Prescreen: >3 drinks/day or >7 drinks/week? No   Prescreen: >3 drinks/day or >7 drinks/week? -   AUDIT SCORE  -     Last PSA:   PSA   Date Value Ref Range Status   12/31/2020 0.53 0 - 4 ug/L Final     Comment:     Assay Method:  Chemiluminescence using Siemens Vista analyzer     Reviewed orders with patient. Reviewed health maintenance and updated orders accordingly - Yes    Reviewed and updated as needed this visit by clinical staff    "Tobacco  Allergies   Problems  Med Hx  Surg Hx           Reviewed and updated as needed this visit by Provider    Allergies   Problems  Med Hx  Surg Hx            Review of Systems   Constitutional: Negative for chills and fever.   HENT: Negative for congestion, ear pain, hearing loss and sore throat.    Eyes: Negative for pain and visual disturbance.   Respiratory: Negative for cough and shortness of breath.    Cardiovascular: Negative for chest pain, palpitations and peripheral edema.   Gastrointestinal: Negative for abdominal pain, constipation, diarrhea, heartburn, hematochezia and nausea.   Genitourinary: Negative for dysuria, frequency, genital sores, hematuria, impotence, penile discharge and urgency.   Musculoskeletal: Negative for arthralgias, joint swelling and myalgias.   Skin: Negative for rash.   Neurological: Negative for dizziness, weakness, headaches and paresthesias.   Psychiatric/Behavioral: Negative for mood changes. The patient is not nervous/anxious.    All other systems reviewed and are negative.        OBJECTIVE:   BP (!) 148/92 (BP Location: Left arm, Patient Position: Sitting, Cuff Size: Adult Large)   Pulse 64   Temp 98.4  F (36.9  C) (Oral)   Resp 16   Ht 1.821 m (5' 11.7\")   Wt 96.4 kg (212 lb 7 oz)   SpO2 98%   BMI 29.05 kg/m      Physical Exam  Vitals reviewed.   Constitutional:       General: He is not in acute distress.     Appearance: Normal appearance. He is not ill-appearing.   HENT:      Head: Normocephalic and atraumatic.      Right Ear: External ear normal.      Left Ear: External ear normal.      Nose: Nose normal.      Mouth/Throat:      Pharynx: Oropharynx is clear. No oropharyngeal exudate or posterior oropharyngeal erythema.   Eyes:      General: No scleral icterus.        Right eye: No discharge.         Left eye: No discharge.      Extraocular Movements: Extraocular movements intact.      Conjunctiva/sclera: Conjunctivae normal.      Pupils: Pupils are " equal, round, and reactive to light.   Neck:      Comments: No thyromegaly.  Cardiovascular:      Rate and Rhythm: Normal rate and regular rhythm.      Heart sounds: Normal heart sounds. No murmur heard.    No friction rub. No gallop.   Pulmonary:      Effort: Pulmonary effort is normal. No respiratory distress.      Breath sounds: Normal breath sounds. No wheezing or rales.   Abdominal:      General: There is no distension.      Palpations: Abdomen is soft. There is no mass.      Tenderness: There is no abdominal tenderness.   Musculoskeletal:         General: No signs of injury. Normal range of motion.      Cervical back: Normal range of motion.      Right lower leg: No edema.      Left lower leg: No edema.   Lymphadenopathy:      Cervical: No cervical adenopathy.   Skin:     General: Skin is warm.      Coloration: Skin is not jaundiced.      Findings: No rash.   Neurological:      General: No focal deficit present.      Mental Status: He is alert and oriented to person, place, and time.      Cranial Nerves: No cranial nerve deficit.      Deep Tendon Reflexes: Reflexes normal.   Psychiatric:         Mood and Affect: Mood normal.         ASSESSMENT/PLAN:     Problem List Items Addressed This Visit     BMI 29.0-29.9,adult    RESOLVED: Chronic pain of right knee    Encounter for routine adult health examination without abnormal findings - Primary     This patient presents for preventative exam.  He desires STD testing.  We reviewed nutrition as in the past, he has loosely met criteria for metabolic syndrome.  He generally cooks his own food.  Given his family history of diabetes, I believe he is at risk for conditions associated with insulin resistance.  I recommended further reduction in carbohydrates/sugars while focusing on protein and vegetables.  He has been on cholesterol-lowering medications in the past although is not currently.  He will return to clinic for fasting lab work.  Screening for diabetes.  He  believes he has had hepatitis B vaccine and we will confirm with antibody titers.  No changes to plan.  We discussed chronic knee pain for which I would refer him back to orthopedics if he continues to be symptomatic.  He also describes some thumb pain.         HSV-2 infection    Relevant Medications    acyclovir (ZOVIRAX) 800 MG tablet    Other Relevant Orders    Basic metabolic panel  (Ca, Cl, CO2, Creat, Gluc, K, Na, BUN)    Other hyperlipidemia    Relevant Medications    rosuvastatin (CRESTOR) 20 MG tablet    Other Relevant Orders    Basic metabolic panel  (Ca, Cl, CO2, Creat, Gluc, K, Na, BUN)    ALT    Lipid panel reflex to direct LDL Fasting   Other Visit Diagnoses     Fatigue, unspecified type        Relevant Medications    rosuvastatin (CRESTOR) 20 MG tablet    Elevated BP without diagnosis of hypertension        Relevant Orders    Basic metabolic panel  (Ca, Cl, CO2, Creat, Gluc, K, Na, BUN)    Elevated fasting glucose        Relevant Orders    Lipid panel reflex to direct LDL Fasting    Hemoglobin A1c    Screening for prostate cancer        Relevant Orders    PSA, screen    Routine screening for STI (sexually transmitted infection)        Relevant Orders    NEISSERIA GONORRHOEA PCR    CHLAMYDIA TRACHOMATIS PCR    Treponema Abs w Reflex to RPR and Titer    HIV Antigen Antibody Combo    Hepatitis B surface antigen    Hepatitis B vaccination status unknown        Relevant Orders    Hepatitis B Surface Antibody        Patient has been advised of split billing requirements and indicates understanding: Yes    COUNSELING:   Reviewed preventive health counseling, as reflected in patient instructions       Regular exercise       Healthy diet/nutrition    He reports that he has never smoked. He has never been exposed to tobacco smoke. He has never used smokeless tobacco.    Surendra Ceja MD  [facility]

## 2024-09-12 DIAGNOSIS — E78.49 OTHER HYPERLIPIDEMIA: ICD-10-CM

## 2024-09-12 DIAGNOSIS — B00.9 HSV-2 INFECTION: ICD-10-CM

## 2024-09-12 RX ORDER — ACYCLOVIR 800 MG/1
TABLET ORAL
Qty: 90 TABLET | Refills: 1 | OUTPATIENT
Start: 2024-09-12

## 2024-09-12 RX ORDER — ROSUVASTATIN CALCIUM 40 MG/1
40 TABLET, COATED ORAL DAILY
Qty: 90 TABLET | Refills: 0 | Status: SHIPPED | OUTPATIENT
Start: 2024-09-12

## 2024-09-12 NOTE — TELEPHONE ENCOUNTER
Patient last appointment for pre op please call patient to see who is PCP is so this can be routed correctly

## 2024-09-12 NOTE — CONFIDENTIAL NOTE
The patient had a physical or there is here with another provider.  He has been seen at a different clinic multiple times.  Refills should come from the clinic he has been seeing most recently.

## 2024-09-12 NOTE — TELEPHONE ENCOUNTER
Spoke with patient. Last annual physical was with Dr. Roberts. Patient requests Dr. Roberts advise on refill.

## 2024-09-17 RX ORDER — ACYCLOVIR 800 MG/1
800 TABLET ORAL
Qty: 30 TABLET | Refills: 5 | Status: SHIPPED | OUTPATIENT
Start: 2024-09-17

## 2024-09-26 ENCOUNTER — MYC REFILL (OUTPATIENT)
Dept: FAMILY MEDICINE | Facility: CLINIC | Age: 54
End: 2024-09-26
Payer: COMMERCIAL

## 2024-09-26 DIAGNOSIS — B00.9 HSV-2 INFECTION: ICD-10-CM

## 2024-09-26 RX ORDER — ACYCLOVIR 800 MG/1
800 TABLET ORAL
Qty: 30 TABLET | Refills: 5 | OUTPATIENT
Start: 2024-09-26

## 2025-02-24 NOTE — PROGRESS NOTES
Office Visit      Date: 2024   Patient Name: Harley Flynn  : 1952   MRN: [id number]     Chief Complaint:    Chief Complaint   Patient presents with    Hypertension     6 month follow up         History of Present Illness: Harley Flynn is a 72 y.o. male presents for follow up.  Follows cardiologist. He has hypertension, history of atrial fibrillation. HLD. Conditions are stable. Taking medications as prescribed. He is on anticoagulatoin with eliquis. Denies chest pain, shortness of breath, tachycardia, palpitations, headache, visual changes.   Anxiety and depression are stable on sertraline. Sleeping well with trazodone. [facility]   Compliant with CPAP for RAFY.  Low testosterone treated by urology.   He follows orthopedics, with Dr. Maikel Zuniga. Uses cane for ambulation assistance but not needing today. Pain is worse this winter. Takes tylenol occasionally when needed.   Rhinitis has improved on xyzal and nasal sprays. Saw an allergist told had minor allergies. Went to ENT who said no nose issues but referred him to speech therapy due to mucus accumulation in throat and inability to clear it despite meds and nasal sprays. He is going to see the same person he saw for speech therapy after his stroke.    Vaccines and screenings reviewed     Subjective      Review of Systems:   Pertinent ROS noted in HPI.     I have reviewed the patients family history, social history, past medical history, past surgical history and have updated it as appropriate.     Medications:     Current Outpatient Medications:     amLODIPine (NORVASC) 10 MG tablet, Take 1 tablet by mouth Daily., Disp: 90 tablet, Rfl: 3    amLODIPine (NORVASC) 2.5 MG tablet, Take 1 tablet by mouth Daily., Disp: 90 tablet, Rfl: 3    apixaban (Eliquis) 5 MG tablet tablet, Take 1 tablet by mouth 2 (Two) Times a Day., Disp: 180 tablet, Rfl: 1    ARIPiprazole (ABILIFY) 5 MG  Clinic Administered Medication Documentation      Injectable Medication Documentation    Patient was given Penicillin G Benzathine (Bicillin). Prior to medication administration, verified patients identity using patient s name and date of birth. Please see MAR and medication order for additional information. Patient instructed to remain in clinic for 15 minutes and report any adverse reaction to staff immediately .      Was entire vial of medication used? Yes  Vial/Syringe: Syringe  Was this medication supplied by the patient? No    PARKER Sherman, RN  [facility]     tablet, , Disp: , Rfl:     atorvastatin (LIPITOR) 80 MG tablet, Take 1 tablet by mouth Every Night., Disp: 90 tablet, Rfl: 3    Austedo XR 42 MG tablet sustained-release 24 hour, , Disp: , Rfl:     Azelastine HCl 137 MCG/SPRAY solution, INSTILL 2 SPRAYS IN EACH NOSTRIL TWO TIMES A DAY AS DIRECTED BY PROVIDER, Disp: 30 mL, Rfl: 12    chlorthalidone (HYGROTON) 25 MG tablet, Take 1 tablet by mouth Daily., Disp: 90 tablet, Rfl: 3    desloratadine (CLARINEX REDITAB) 5 MG disintegrating tablet, Place 1 tablet on the tongue Daily., Disp: 90 tablet, Rfl: 3    hydrOXYzine pamoate (VISTARIL) 100 MG capsule, , Disp: , Rfl:     ipratropium (ATROVENT) 0.03 % nasal spray, 2 sprays into the nostril(s) as directed by provider Every 12 (Twelve) Hours., Disp: 30 mL, Rfl: 12    lisinopril (PRINIVIL,ZESTRIL) 40 MG tablet, TAKE ONE TABLET BY MOUTH EVERY DAY, Disp: 90 tablet, Rfl: 3    metFORMIN (GLUCOPHAGE) 500 MG tablet, TAKE ONE TABLET BY MOUTH TWO TIMES A DAY WITH MEALS, Disp: 180 tablet, Rfl: 3    metoprolol succinate XL (TOPROL-XL) 25 MG 24 hr tablet, , Disp: , Rfl:     Multiple Vitamins-Minerals (EYE VITAMINS PO), Take 1 tablet by mouth 2 (Two) Times a Day., Disp: , Rfl:     multivitamin with minerals tablet tablet, Take 1 tablet by mouth Daily., Disp: , Rfl:     sertraline (ZOLOFT) 100 MG tablet, Take 1 tablet by mouth., Disp: , Rfl:     traZODone (DESYREL) 100 MG tablet, , Disp: , Rfl:     Allergies:   No Known Allergies    Objective     Physical Exam  Vitals and nursing note reviewed.   Constitutional:       General: He is not in acute distress.     Appearance: Normal appearance. He is obese.   HENT:      Head: Normocephalic and atraumatic.      Right Ear: External ear normal.      Left Ear: External ear normal.      Nose: Nose normal. No congestion or rhinorrhea.      Right Sinus: No maxillary sinus tenderness or frontal sinus tenderness.      Left Sinus: No maxillary sinus tenderness or frontal sinus tenderness.       "Mouth/Throat:      Lips: Pink.      Mouth: Mucous membranes are moist.      Pharynx: Oropharynx is clear. Uvula midline. No postnasal drip.      Comments: Can hear mucus in throat, tries to clear  Eyes:      General: No scleral icterus.     Extraocular Movements: Extraocular movements intact.      Conjunctiva/sclera: Conjunctivae normal.      Pupils: Pupils are equal, round, and reactive to light.   Neck:      Thyroid: No thyromegaly.      Vascular: No carotid bruit.      Trachea: Phonation normal.   Cardiovascular:      Rate and Rhythm: Normal rate and regular rhythm.      Pulses: Normal pulses.   Pulmonary:      Effort: Pulmonary effort is normal.      Breath sounds: Normal breath sounds.   Abdominal:      General: Abdomen is flat. Bowel sounds are normal. There is no distension.      Palpations: Abdomen is soft.      Tenderness: There is no abdominal tenderness. There is no guarding.   Musculoskeletal:         General: Deformity present.      Cervical back: Normal range of motion and neck supple.      Right lower leg: No edema.      Left lower leg: No edema.   Lymphadenopathy:      Cervical: No cervical adenopathy.   Skin:     General: Skin is warm and dry.      Coloration: Skin is not jaundiced or pale.      Findings: No rash.   Neurological:      Mental Status: He is alert and oriented to person, place, and time.      Cranial Nerves: No cranial nerve deficit.      Motor: No weakness.      Coordination: Coordination normal.      Gait: Gait abnormal.   Psychiatric:         Mood and Affect: Mood normal.         Behavior: Behavior normal.         Thought Content: Thought content normal.         Vital Signs:   Vitals:    02/24/25 1032   BP: 130/80   Pulse: 67   Resp: 17   Temp: 98.9 °F (37.2 °C)   TempSrc: Infrared   SpO2: 97%   Weight: 113 kg (249 lb)   Height: 177.9 cm (70.03\")     Body mass index is 35.7 kg/m².    Assessment / Plan      Assessment/Plan:   Diagnoses and all orders for this visit:    1. Primary " hypertension (Primary)  -     CBC No Differential  -     Comprehensive Metabolic Panel  -     Hemoglobin A1c  -     TSH Rfx On Abnormal To Free T4  -     amLODIPine (NORVASC) 2.5 MG tablet; Take 1 tablet by mouth Daily.  Dispense: 90 tablet; Refill: 3  -     lisinopril (PRINIVIL,ZESTRIL) 40 MG tablet; Take 1 tablet by mouth Daily.  Dispense: 90 tablet; Refill: 3  - Stable. Continue current medications as prescribed. Recommend DASH diet, 30 minutes of exercise 5 times/week, medication compliance, and home blood pressure monitoring.     2. Paroxysmal A-fib  -     TSH Rfx On Abnormal To Free T4  -     apixaban (Eliquis) 5 MG tablet tablet; Take 1 tablet by mouth 2 (Two) Times a Day.  Dispense: 180 tablet; Refill: 1  - Stable. Continue medication and anticoagulation. Follow up cardiology.     3. Dyslipidemia  -     TSH Rfx On Abnormal To Free T4  -     atorvastatin (LIPITOR) 80 MG tablet; Take 1 tablet by mouth Every Night.  Dispense: 90 tablet; Refill: 3   - Lipid panel utd, stable. Continue statin nightly.     4. Hyperglycemia  -     Comprehensive Metabolic Panel  -     Hemoglobin A1c  -     metFORMIN (GLUCOPHAGE) 500 MG tablet; Take 1 tablet by mouth 2 (Two) Times a Day With Meals.  Dispense: 180 tablet; Refill: 3  - Diet and regular exercise recommended.     5. Anxiety and depression  -     TSH Rfx On Abnormal To Free T4  -     sertraline (ZOLOFT) 50 MG tablet; Take 1 tablet by mouth Daily.  Dispense: 90 tablet; Refill: 3  - Stable on Zoloft. Continue as directed.     6. Environmental and seasonal allergies  -     azelastine (ASTELIN) 0.1 % nasal spray; 2 sprays into the nostril(s) as directed by provider 2 (Two) Times a Day. Use in each nostril as directed  Dispense: 30 mL; Refill: 12  -     fluticasone (FLONASE) 50 MCG/ACT nasal spray; 2 sprays into the nostril(s) as directed by provider Daily.  Dispense: 16 g; Refill: 12  -     levocetirizine (XYZAL) 5 MG tablet; Take 1 tablet by mouth Every Evening.  Dispense:  90 tablet; Refill: 3  -     Continue medications for allergies prn. Saw allergist and ENT and only minor allergies found. No sinus issues detected. Referred to speech therapy by ENT awaiting appointment.      7. Arthritis   -     Ok to take tylenol arthritis 600-1300 mg every 8 hours as needed for pain. Discussed otc pain relief gels and creams to use prn but avoid nsaids.  Stay active. Consider pain management referral if pain is uncontrolled with conservative treatments. Continue following up with orthopedics prn.        Follow Up:   Return in about 26 weeks (around 8/25/2025) for Medicare Wellness, Annual Physical.      Marielle RAUSCH  [facility]

## 2025-03-01 ENCOUNTER — HEALTH MAINTENANCE LETTER (OUTPATIENT)
Age: 55
End: 2025-03-01

## 2025-04-12 ENCOUNTER — MYC REFILL (OUTPATIENT)
Dept: FAMILY MEDICINE | Facility: CLINIC | Age: 55
End: 2025-04-12
Payer: COMMERCIAL

## 2025-04-12 DIAGNOSIS — E78.49 OTHER HYPERLIPIDEMIA: ICD-10-CM

## 2025-04-14 RX ORDER — ROSUVASTATIN CALCIUM 40 MG/1
40 TABLET, COATED ORAL DAILY
Qty: 30 TABLET | Refills: 0 | Status: SHIPPED | OUTPATIENT
Start: 2025-04-14

## 2025-05-01 ENCOUNTER — OFFICE VISIT (OUTPATIENT)
Dept: FAMILY MEDICINE | Facility: CLINIC | Age: 55
End: 2025-05-01
Payer: COMMERCIAL

## 2025-05-01 VITALS
WEIGHT: 196.13 LBS | HEIGHT: 72 IN | BODY MASS INDEX: 26.56 KG/M2 | TEMPERATURE: 98.9 F | OXYGEN SATURATION: 98 % | SYSTOLIC BLOOD PRESSURE: 112 MMHG | DIASTOLIC BLOOD PRESSURE: 69 MMHG | RESPIRATION RATE: 16 BRPM | HEART RATE: 57 BPM

## 2025-05-01 DIAGNOSIS — Z11.3 SCREENING EXAMINATION FOR STI: ICD-10-CM

## 2025-05-01 DIAGNOSIS — B00.9 HSV-2 INFECTION: ICD-10-CM

## 2025-05-01 DIAGNOSIS — E78.5 HYPERLIPIDEMIA LDL GOAL <130: ICD-10-CM

## 2025-05-01 DIAGNOSIS — Z13.6 SCREENING FOR CARDIOVASCULAR CONDITION: ICD-10-CM

## 2025-05-01 DIAGNOSIS — Z12.5 SCREENING FOR PROSTATE CANCER: ICD-10-CM

## 2025-05-01 DIAGNOSIS — E78.49 OTHER HYPERLIPIDEMIA: ICD-10-CM

## 2025-05-01 DIAGNOSIS — Z00.00 ROUTINE GENERAL MEDICAL EXAMINATION AT A HEALTH CARE FACILITY: Primary | ICD-10-CM

## 2025-05-01 LAB
HIV 1+2 AB+HIV1 P24 AG SERPL QL IA: NONREACTIVE
T PALLIDUM AB SER QL: REACTIVE

## 2025-05-01 RX ORDER — ACYCLOVIR 800 MG/1
800 TABLET ORAL
Qty: 30 TABLET | Refills: 5 | Status: SHIPPED | OUTPATIENT
Start: 2025-05-01

## 2025-05-01 RX ORDER — ROSUVASTATIN CALCIUM 40 MG/1
40 TABLET, COATED ORAL DAILY
Qty: 90 TABLET | Refills: 3 | Status: SHIPPED | OUTPATIENT
Start: 2025-05-01

## 2025-05-01 SDOH — HEALTH STABILITY: PHYSICAL HEALTH: ON AVERAGE, HOW MANY DAYS PER WEEK DO YOU ENGAGE IN MODERATE TO STRENUOUS EXERCISE (LIKE A BRISK WALK)?: 3 DAYS

## 2025-05-01 ASSESSMENT — SOCIAL DETERMINANTS OF HEALTH (SDOH): HOW OFTEN DO YOU GET TOGETHER WITH FRIENDS OR RELATIVES?: TWICE A WEEK

## 2025-05-01 NOTE — PATIENT INSTRUCTIONS
Patient Education   Preventive Care Advice   This is general advice given by our system to help you stay healthy. However, your care team may have specific advice just for you. Please talk to your care team about your preventive care needs.  Nutrition  Eat 5 or more servings of fruits and vegetables each day.  Try wheat bread, brown rice and whole grain pasta (instead of white bread, rice, and pasta).  Get enough calcium and vitamin D. Check the label on foods and aim for 100% of the RDA (recommended daily allowance).  Lifestyle  Exercise at least 150 minutes each week  (30 minutes a day, 5 days a week).  Do muscle strengthening activities 2 days a week. These help control your weight and prevent disease.  No smoking.  Wear sunscreen to prevent skin cancer.  Have a dental exam and cleaning every 6 months.  Yearly exams  See your health care team every year to talk about:  Any changes in your health.  Any medicines your care team has prescribed.  Preventive care, family planning, and ways to prevent chronic diseases.  Shots (vaccines)   HPV shots (up to age 26), if you've never had them before.  Hepatitis B shots (up to age 59), if you've never had them before.  COVID-19 shot: Get this shot when it's due.  Flu shot: Get a flu shot every year.  Tetanus shot: Get a tetanus shot every 10 years.  Pneumococcal, hepatitis A, and RSV shots: Ask your care team if you need these based on your risk.  Shingles shot (for age 50 and up)  General health tests  Diabetes screening:  Starting at age 35, Get screened for diabetes at least every 3 years.  If you are younger than age 35, ask your care team if you should be screened for diabetes.  Cholesterol test: At age 39, start having a cholesterol test every 5 years, or more often if advised.  Bone density scan (DEXA): At age 50, ask your care team if you should have this scan for osteoporosis (brittle bones).  Hepatitis C: Get tested at least once in your life.  STIs (sexually  transmitted infections)  Before age 24: Ask your care team if you should be screened for STIs.  After age 24: Get screened for STIs if you're at risk. You are at risk for STIs (including HIV) if:  You are sexually active with more than one person.  You don't use condoms every time.  You or a partner was diagnosed with a sexually transmitted infection.  If you are at risk for HIV, ask about PrEP medicine to prevent HIV.  Get tested for HIV at least once in your life, whether you are at risk for HIV or not.  Cancer screening tests  Cervical cancer screening: If you have a cervix, begin getting regular cervical cancer screening tests starting at age 21.  Breast cancer scan (mammogram): If you've ever had breasts, begin having regular mammograms starting at age 40. This is a scan to check for breast cancer.  Colon cancer screening: It is important to start screening for colon cancer at age 45.  Have a colonoscopy test every 10 years (or more often if you're at risk) Or, ask your provider about stool tests like a FIT test every year or Cologuard test every 3 years.  To learn more about your testing options, visit:   .  For help making a decision, visit:   https://bit.ly/dq16819.  Prostate cancer screening test: If you have a prostate, ask your care team if a prostate cancer screening test (PSA) at age 55 is right for you.  Lung cancer screening: If you are a current or former smoker ages 50 to 80, ask your care team if ongoing lung cancer screenings are right for you.  For informational purposes only. Not to replace the advice of your health care provider. Copyright   2023 [employer]. All rights reserved. Clinically reviewed by the [facility] Transitions Program. [place] [id number] - REV 01/24.

## 2025-05-01 NOTE — PROGRESS NOTES
Preventive Care Visit  [facility]  Freddie De La Rosa PA-C, Family Medicine  May 1, 2025      Assessment & Plan     Routine general medical examination at a health care facility  Stable exam. Weight much improved with diet. Keep up great work.   - Comprehensive metabolic panel (BMP + Alb, Alk Phos, ALT, AST, Total. Bili, TP); Future  - Comprehensive metabolic panel (BMP + Alb, Alk Phos, ALT, AST, Total. Bili, TP)    Other hyperlipidemia  On current regimen.  Will recheck LDL.  Likely further improvements given weight management.  If LDL is less than 50, consider reduction of Crestor dose.  Otherwise if stable/acceptable to maintain and recheck annually.  - rosuvastatin (CRESTOR) 40 MG tablet; Take 1 tablet (40 mg) by mouth daily.  Medication use and side effects discussed with the patient. Patient is in complete understanding and agreement with plan.     HSV-2 infection  Stable on current regimen.  Rare use.  Refills given.  - acyclovir (ZOVIRAX) 800 MG tablet; Take 1 tablet (800 mg) by mouth 5 times daily. As needed  Medication use and side effects discussed with the patient. Patient is in complete understanding and agreement with plan.     Screening for cardiovascular condition      Hyperlipidemia LDL goal <130    - Lipid panel reflex to direct LDL Fasting; Future  - Lipid panel reflex to direct LDL Fasting    Screening for prostate cancer    - PSA, screen; Future  - PSA, screen    Screening examination for STI  Discussed.  No new partners though patient would like routine testing.  No symptoms.  Syphilis in the past has been treated and reassessed appropriately.  - Chlamydia trachomatis/Neisseria gonorrhoeae by PCR; Future  - Treponema Abs w Reflex to RPR and Titer; Future  - HIV Antigen Antibody Combo; Future  - Chlamydia trachomatis/Neisseria gonorrhoeae by PCR  - Treponema Abs w Reflex to RPR and Titer  - HIV Antigen Antibody Combo    Patient has been advised of split billing  "requirements and indicates understanding: Yes        BMI  Estimated body mass index is 26.97 kg/m  as calculated from the following:    Height as of this encounter: 1.816 m (5' 11.5\").    Weight as of this encounter: 89 kg (196 lb 2 oz).   Weight management plan: Discussed healthy diet and exercise guidelines    Counseling  Appropriate preventive services were addressed with this patient via screening, questionnaire, or discussion as appropriate for fall prevention, nutrition, physical activity, Tobacco-use cessation, social engagement, weight loss and cognition.  Checklist reviewing preventive services available has been given to the patient.  Reviewed patient's diet, addressing concerns and/or questions.   He is at risk for lack of exercise and has been provided with information to increase physical activity for the benefit of his well-being.         Subjective   Will is a 54 year old, presenting for the following:  Physical         HPI  History of hsv-2. Stable on current acyclovir. Rare use prn use. Once every 2 years on average. No side effects of concerns.     History of hyperlipidemia. Stable on current regimen. No side effects. Takes as prescribed.          Advance Care Planning    Discussed advance care planning with patient; informed AVS has link to [employer].        5/1/2025   General Health   How would you rate your overall physical health? Good   Feel stress (tense, anxious, or unable to sleep) Only a little   (!) STRESS CONCERN      5/1/2025   Nutrition   Three or more servings of calcium each day? Yes   Diet: Regular (no restrictions)   How many servings of fruit and vegetables per day? (!) 2-3   How many sweetened beverages each day? 0-1         5/1/2025   Exercise   Days per week of moderate/strenous exercise 3 days         5/1/2025   Social Factors   Frequency of gathering with friends or relatives Twice a week   Worry food won't last until get money to buy more No   Food not last or not " have enough money for food? No   Do you have housing? (Housing is defined as stable permanent housing and does not include staying outside in a car, in a tent, in an abandoned building, in an overnight shelter, or couch-surfing.) Yes   Are you worried about losing your housing? No   Lack of transportation? No   Unable to get utilities (heat,electricity)? No         5/1/2025   Fall Risk   Fallen 2 or more times in the past year? No   Trouble with walking or balance? No          5/1/2025   Dental   Dentist two times every year? Yes         Today's PHQ-2 Score:       5/1/2025     8:26 AM   PHQ-2 ( 1999 [employer])   Q1: Little interest or pleasure in doing things 0   Q2: Feeling down, depressed or hopeless 0   PHQ-2 Score 0    Q1: Little interest or pleasure in doing things Not at all   Q2: Feeling down, depressed or hopeless Not at all   PHQ-2 Score 0       Patient-reported           5/1/2025   Substance Use   Alcohol more than 3/day or more than 7/wk No   Do you use any other substances recreationally? No     Social History     Tobacco Use    Smoking status: Never     Passive exposure: Never    Smokeless tobacco: Never   Vaping Use    Vaping status: Never Used   Substance Use Topics    Alcohol use: Not Currently     Comment: was drinking daily until 12/27 when I emptied house of alc.    Drug use: No           5/1/2025   STI Screening   New sexual partner(s) since last STI/HIV test? No   ASCVD Risk   The 10-year ASCVD risk score (Zenaida STALEY, et al., 2019) is: 4.4%    Values used to calculate the score:      Age: 54 years      Sex: Male      Is Non- : No      Diabetic: No      Tobacco smoker: No      Systolic Blood Pressure: 112 mmHg      Is BP treated: No      HDL Cholesterol: 39 mg/dL      Total Cholesterol: 190 mg/dL           Reviewed and updated as needed this visit by Provider   Tobacco  Allergies  Meds  Problems  Med Hx  Surg Hx  Fam Hx                   Objective    Exam  BP  "112/69 (BP Location: Left arm, Patient Position: Sitting, Cuff Size: Adult Regular)   Pulse 57   Temp 98.9  F (37.2  C) (Oral)   Resp 16   Ht 1.816 m (5' 11.5\")   Wt 89 kg (196 lb 2 oz)   SpO2 98%   BMI 26.97 kg/m     Estimated body mass index is 26.97 kg/m  as calculated from the following:    Height as of this encounter: 1.816 m (5' 11.5\").    Weight as of this encounter: 89 kg (196 lb 2 oz).    Physical Exam  GENERAL: alert and no distress  EYES: Eyes grossly normal to inspection, PERRL and conjunctivae and sclerae normal  HENT: ear canals and TM's normal, nose and mouth without ulcers or lesions  NECK: no adenopathy, no asymmetry, masses, or scars  RESP: lungs clear to auscultation - no rales, rhonchi or wheezes  CV: regular rate and rhythm, normal S1 S2, no S3 or S4, no murmur, click or rub, no peripheral edema  ABDOMEN: soft, nontender, no hepatosplenomegaly, no masses and bowel sounds normal  MS: no gross musculoskeletal defects noted, no edema  SKIN: no suspicious lesions or rashes  NEURO: Normal strength and tone, mentation intact and speech normal  PSYCH: mentation appears normal, affect normal/bright  LYMPH: no cervical adenopathy        Signed Electronically by: Freddie De La Rosa PA-C  The longitudinal plan of care for the diagnosis(es)/condition(s) as documented were addressed during this visit. Due to the added complexity in care, I will continue to support Will in the subsequent management and with ongoing continuity of care.    "

## 2025-05-04 LAB — T PALLIDUM AB SER QL AGGL: NON REACTIVE

## (undated) DEVICE — BNDG ESMARK 6" STERILE

## (undated) DEVICE — BLADE SHVR 13CM 3.8MM EXCLBR COOLCUT STRL DISP AR-8380EX

## (undated) DEVICE — GLOVE BIOGEL PI ULTRATOUCH G SZ 8.0 42180

## (undated) DEVICE — BNDG ELASTIC 6"X5YDS UNSTERILE 6611-60

## (undated) DEVICE — SOL NACL 0.9% IRRIG 3000ML BAG 07972-08

## (undated) DEVICE — GLOVE BIOGEL PI MICRO SZ 7.5 48575

## (undated) DEVICE — SU PROLENE 3-0 PS-2 18" 8687H

## (undated) DEVICE — SOL PRP PVP IOD .75OZ PCH PKT CNTNR STRL DYNDA2232A

## (undated) DEVICE — PREP CHLORAPREP 26ML TINTED ORANGE  260815

## (undated) DEVICE — GLOVE BIOGEL PI MICRO INDICATOR UNDERGLOVE SZ 8.0 48980

## (undated) DEVICE — PACK ARTHROSCOPY KNEE SOP15AKFSM

## (undated) DEVICE — DRSG STERI STRIP 1/2X4" R1547

## (undated) DEVICE — TUBING ARTHROSCOPY PUMP ARTHREX AR-6410

## (undated) DEVICE — DRAPE SHEET 3/4 78X60"

## (undated) DEVICE — SOL WATER IRRIG 1000ML BOTTLE 07139-09

## (undated) DEVICE — DRAPE STERI U 1015

## (undated) DEVICE — GLOVE BIOGEL PI MICRO INDICATOR UNDERGLOVE SZ 7.5 48975

## (undated) RX ORDER — KETOROLAC TROMETHAMINE 30 MG/ML
INJECTION, SOLUTION INTRAMUSCULAR; INTRAVENOUS
Status: DISPENSED
Start: 2024-03-21

## (undated) RX ORDER — CEFAZOLIN SODIUM 2 G/50ML
SOLUTION INTRAVENOUS
Status: DISPENSED
Start: 2024-03-21

## (undated) RX ORDER — OXYCODONE HYDROCHLORIDE 5 MG/1
TABLET ORAL
Status: DISPENSED
Start: 2024-03-21

## (undated) RX ORDER — EPHEDRINE SULFATE 50 MG/ML
INJECTION, SOLUTION INTRAMUSCULAR; INTRAVENOUS; SUBCUTANEOUS
Status: DISPENSED
Start: 2024-03-21

## (undated) RX ORDER — BUPIVACAINE HYDROCHLORIDE 2.5 MG/ML
INJECTION, SOLUTION INFILTRATION; PERINEURAL
Status: DISPENSED
Start: 2024-03-21

## (undated) RX ORDER — FENTANYL CITRATE 50 UG/ML
INJECTION, SOLUTION INTRAMUSCULAR; INTRAVENOUS
Status: DISPENSED
Start: 2024-03-21

## (undated) RX ORDER — ONDANSETRON 2 MG/ML
INJECTION INTRAMUSCULAR; INTRAVENOUS
Status: DISPENSED
Start: 2024-03-21

## (undated) RX ORDER — LIDOCAINE HYDROCHLORIDE 10 MG/ML
INJECTION, SOLUTION EPIDURAL; INFILTRATION; INTRACAUDAL; PERINEURAL
Status: DISPENSED
Start: 2024-03-21

## (undated) RX ORDER — ACETAMINOPHEN 325 MG/1
TABLET ORAL
Status: DISPENSED
Start: 2024-03-21

## (undated) RX ORDER — GLYCOPYRROLATE 0.2 MG/ML
INJECTION, SOLUTION INTRAMUSCULAR; INTRAVENOUS
Status: DISPENSED
Start: 2024-03-21

## (undated) RX ORDER — DEXAMETHASONE SODIUM PHOSPHATE 4 MG/ML
INJECTION, SOLUTION INTRA-ARTICULAR; INTRALESIONAL; INTRAMUSCULAR; INTRAVENOUS; SOFT TISSUE
Status: DISPENSED
Start: 2024-03-21

## (undated) RX ORDER — PROPOFOL 10 MG/ML
INJECTION, EMULSION INTRAVENOUS
Status: DISPENSED
Start: 2024-03-21